# Patient Record
Sex: FEMALE | Race: BLACK OR AFRICAN AMERICAN | Employment: PART TIME
[De-identification: names, ages, dates, MRNs, and addresses within clinical notes are randomized per-mention and may not be internally consistent; named-entity substitution may affect disease eponyms.]

---

## 2017-01-11 ENCOUNTER — OFFICE VISIT (OUTPATIENT)
Dept: FAMILY MEDICINE CLINIC | Facility: CLINIC | Age: 62
End: 2017-01-11

## 2017-01-11 VITALS
HEART RATE: 71 BPM | DIASTOLIC BLOOD PRESSURE: 80 MMHG | SYSTOLIC BLOOD PRESSURE: 129 MMHG | HEIGHT: 66 IN | WEIGHT: 176.38 LBS | BODY MASS INDEX: 28.34 KG/M2

## 2017-01-11 DIAGNOSIS — E78.5 DYSLIPIDEMIA: ICD-10-CM

## 2017-01-11 DIAGNOSIS — E66.09 NON MORBID OBESITY DUE TO EXCESS CALORIES: ICD-10-CM

## 2017-01-11 DIAGNOSIS — Z00.00 PHYSICAL EXAM, ANNUAL: ICD-10-CM

## 2017-01-11 DIAGNOSIS — Z29.8 NEED FOR MALARIA PROPHYLAXIS: Primary | ICD-10-CM

## 2017-01-11 LAB
BILIRUBIN, POC: NORMAL
BLOOD URINE, POC: NORMAL
CLARITY, POC: CLEAR
COLOR, POC: YELLOW
GLUCOSE URINE, POC: NORMAL
KETONES, POC: NORMAL
LEUKOCYTE EST, POC: NORMAL
NITRITE, POC: NORMAL
PH, POC: 7
PROTEIN, POC: NORMAL
SPECIFIC GRAVITY, POC: 1.01
UROBILINOGEN, POC: NORMAL

## 2017-01-11 PROCEDURE — 99204 OFFICE O/P NEW MOD 45 MIN: CPT | Performed by: FAMILY MEDICINE

## 2017-01-11 PROCEDURE — 81003 URINALYSIS AUTO W/O SCOPE: CPT | Performed by: FAMILY MEDICINE

## 2017-01-11 ASSESSMENT — ENCOUNTER SYMPTOMS
ABDOMINAL PAIN: 0
DIARRHEA: 0
CHEST TIGHTNESS: 0
RHINORRHEA: 0
BACK PAIN: 0
TROUBLE SWALLOWING: 0
SORE THROAT: 0
SHORTNESS OF BREATH: 0
CONSTIPATION: 0
NAUSEA: 0
COUGH: 0

## 2017-01-23 LAB
ALBUMIN SERPL-MCNC: 4 G/DL
ALP BLD-CCNC: 69 U/L
ALT SERPL-CCNC: 17 U/L
AST SERPL-CCNC: 23 U/L
BASOPHILS ABSOLUTE: 0 /ΜL
BASOPHILS RELATIVE PERCENT: 0.4 %
BILIRUB SERPL-MCNC: 0.4 MG/DL (ref 0.1–1.4)
BUN BLDV-MCNC: 15 MG/DL
CALCIUM SERPL-MCNC: 10.1 MG/DL
CHLORIDE BLD-SCNC: 106 MMOL/L
CHOLESTEROL, TOTAL: 210 MG/DL
CHOLESTEROL/HDL RATIO: 4
CO2: 29 MMOL/L
CREAT SERPL-MCNC: 0.92 MG/DL
EOSINOPHILS ABSOLUTE: 0.2 /ΜL
EOSINOPHILS RELATIVE PERCENT: 2 %
GFR CALCULATED: 60
GLUCOSE BLD-MCNC: 86 MG/DL
HCT VFR BLD CALC: 40.3 % (ref 36–46)
HDLC SERPL-MCNC: 53 MG/DL (ref 35–70)
HEMOGLOBIN: 13.1 G/DL (ref 12–16)
LDL CHOLESTEROL CALCULATED: 138 MG/DL (ref 0–160)
LYMPHOCYTES ABSOLUTE: 4.2 /ΜL
LYMPHOCYTES RELATIVE PERCENT: 53.3 %
MCH RBC QN AUTO: 27 PG
MCHC RBC AUTO-ENTMCNC: 32.6 G/DL
MCV RBC AUTO: 83 FL
MONOCYTES ABSOLUTE: 0.4 /ΜL
MONOCYTES RELATIVE PERCENT: 4.9 %
NEUTROPHILS ABSOLUTE: 3.1 /ΜL
NEUTROPHILS RELATIVE PERCENT: 39.4 %
PDW BLD-RTO: 13.4 %
PLATELET # BLD: 258 K/ΜL
PMV BLD AUTO: 8.3 FL
POTASSIUM SERPL-SCNC: 4 MMOL/L
RBC # BLD: 4.86 10^6/ΜL
SODIUM BLD-SCNC: 141 MMOL/L
TOTAL PROTEIN: 7.2
TRIGL SERPL-MCNC: 93 MG/DL
VLDLC SERPL CALC-MCNC: 19 MG/DL
WBC # BLD: 7.9 10^3/ML

## 2017-01-24 DIAGNOSIS — Z00.00 PHYSICAL EXAM, ANNUAL: ICD-10-CM

## 2017-01-24 DIAGNOSIS — E78.5 DYSLIPIDEMIA: ICD-10-CM

## 2017-02-13 ENCOUNTER — OFFICE VISIT (OUTPATIENT)
Dept: FAMILY MEDICINE CLINIC | Facility: CLINIC | Age: 62
End: 2017-02-13

## 2017-02-13 VITALS
SYSTOLIC BLOOD PRESSURE: 135 MMHG | HEIGHT: 66 IN | HEART RATE: 69 BPM | DIASTOLIC BLOOD PRESSURE: 79 MMHG | BODY MASS INDEX: 28.45 KG/M2 | WEIGHT: 177 LBS

## 2017-02-13 DIAGNOSIS — E78.2 MIXED HYPERLIPIDEMIA: ICD-10-CM

## 2017-02-13 DIAGNOSIS — E66.09 NON MORBID OBESITY DUE TO EXCESS CALORIES: ICD-10-CM

## 2017-02-13 DIAGNOSIS — Z29.8 NEED FOR MALARIA PROPHYLAXIS: Primary | ICD-10-CM

## 2017-02-13 PROCEDURE — G8484 FLU IMMUNIZE NO ADMIN: HCPCS | Performed by: FAMILY MEDICINE

## 2017-02-13 PROCEDURE — G8419 CALC BMI OUT NRM PARAM NOF/U: HCPCS | Performed by: FAMILY MEDICINE

## 2017-02-13 PROCEDURE — G8427 DOCREV CUR MEDS BY ELIG CLIN: HCPCS | Performed by: FAMILY MEDICINE

## 2017-02-13 PROCEDURE — 1036F TOBACCO NON-USER: CPT | Performed by: FAMILY MEDICINE

## 2017-02-13 PROCEDURE — 3014F SCREEN MAMMO DOC REV: CPT | Performed by: FAMILY MEDICINE

## 2017-02-13 PROCEDURE — 99213 OFFICE O/P EST LOW 20 MIN: CPT | Performed by: FAMILY MEDICINE

## 2017-02-13 PROCEDURE — 3017F COLORECTAL CA SCREEN DOC REV: CPT | Performed by: FAMILY MEDICINE

## 2017-02-13 RX ORDER — ATOVAQUONE AND PROGUANIL HYDROCHLORIDE 250; 100 MG/1; MG/1
1 TABLET, FILM COATED ORAL WEEKLY
Qty: 20 TABLET | Refills: 0 | Status: SHIPPED | OUTPATIENT
Start: 2017-02-13 | End: 2017-09-20 | Stop reason: ALTCHOICE

## 2017-02-13 ASSESSMENT — PATIENT HEALTH QUESTIONNAIRE - PHQ9
SUM OF ALL RESPONSES TO PHQ9 QUESTIONS 1 & 2: 0
2. FEELING DOWN, DEPRESSED OR HOPELESS: 0
1. LITTLE INTEREST OR PLEASURE IN DOING THINGS: 0
SUM OF ALL RESPONSES TO PHQ QUESTIONS 1-9: 0

## 2017-02-15 ENCOUNTER — HOSPITAL ENCOUNTER (OUTPATIENT)
Age: 62
Setting detail: SPECIMEN
Discharge: HOME | End: 2017-02-15
Payer: COMMERCIAL

## 2017-02-15 ENCOUNTER — OFFICE VISIT (OUTPATIENT)
Dept: OBGYN | Facility: CLINIC | Age: 62
End: 2017-02-15

## 2017-02-15 VITALS
HEIGHT: 66 IN | DIASTOLIC BLOOD PRESSURE: 70 MMHG | SYSTOLIC BLOOD PRESSURE: 120 MMHG | WEIGHT: 183 LBS | BODY MASS INDEX: 29.41 KG/M2

## 2017-02-15 DIAGNOSIS — Z01.419 VISIT FOR GYNECOLOGIC EXAMINATION: Primary | ICD-10-CM

## 2017-02-15 DIAGNOSIS — Z12.31 ENCOUNTER FOR SCREENING MAMMOGRAM FOR BREAST CANCER: ICD-10-CM

## 2017-02-15 PROCEDURE — 99396 PREV VISIT EST AGE 40-64: CPT | Performed by: OBSTETRICS & GYNECOLOGY

## 2017-02-15 ASSESSMENT — ENCOUNTER SYMPTOMS
CONSTIPATION: 0
ABDOMINAL PAIN: 0
SHORTNESS OF BREATH: 0
ABDOMINAL DISTENTION: 0
DIARRHEA: 0
COUGH: 0
BACK PAIN: 0

## 2017-02-20 LAB — CYTOLOGY REPORT: NORMAL

## 2017-02-24 ASSESSMENT — ENCOUNTER SYMPTOMS
COUGH: 0
ABDOMINAL PAIN: 0
NAUSEA: 0
TROUBLE SWALLOWING: 0
DIARRHEA: 0
SHORTNESS OF BREATH: 0
RHINORRHEA: 0
SORE THROAT: 0
BACK PAIN: 0
CHEST TIGHTNESS: 0
CONSTIPATION: 0

## 2017-09-20 ENCOUNTER — OFFICE VISIT (OUTPATIENT)
Dept: FAMILY MEDICINE CLINIC | Age: 62
End: 2017-09-20
Payer: COMMERCIAL

## 2017-09-20 VITALS
WEIGHT: 171 LBS | DIASTOLIC BLOOD PRESSURE: 73 MMHG | HEIGHT: 66 IN | SYSTOLIC BLOOD PRESSURE: 123 MMHG | BODY MASS INDEX: 27.48 KG/M2 | HEART RATE: 69 BPM

## 2017-09-20 DIAGNOSIS — Z12.39 SPECIAL SCREENING EXAMINATION FOR NEOPLASM OF BREAST: ICD-10-CM

## 2017-09-20 DIAGNOSIS — J31.0 CHRONIC HYPERTROPHIC RHINITIS: ICD-10-CM

## 2017-09-20 DIAGNOSIS — M79.2 NEURALGIA OF RIGHT UPPER EXTREMITY: ICD-10-CM

## 2017-09-20 DIAGNOSIS — H92.01 OTALGIA OF RIGHT EAR: ICD-10-CM

## 2017-09-20 DIAGNOSIS — H61.21 IMPACTED CERUMEN OF RIGHT EAR: ICD-10-CM

## 2017-09-20 DIAGNOSIS — G44.031 INTRACTABLE EPISODIC PAROXYSMAL HEMICRANIA: Primary | ICD-10-CM

## 2017-09-20 DIAGNOSIS — G47.9 SLEEP DISORDER: ICD-10-CM

## 2017-09-20 DIAGNOSIS — E66.09 NON MORBID OBESITY DUE TO EXCESS CALORIES: ICD-10-CM

## 2017-09-20 PROCEDURE — G8419 CALC BMI OUT NRM PARAM NOF/U: HCPCS | Performed by: FAMILY MEDICINE

## 2017-09-20 PROCEDURE — 3014F SCREEN MAMMO DOC REV: CPT | Performed by: FAMILY MEDICINE

## 2017-09-20 PROCEDURE — 1036F TOBACCO NON-USER: CPT | Performed by: FAMILY MEDICINE

## 2017-09-20 PROCEDURE — 99214 OFFICE O/P EST MOD 30 MIN: CPT | Performed by: FAMILY MEDICINE

## 2017-09-20 PROCEDURE — G8427 DOCREV CUR MEDS BY ELIG CLIN: HCPCS | Performed by: FAMILY MEDICINE

## 2017-09-20 PROCEDURE — 3017F COLORECTAL CA SCREEN DOC REV: CPT | Performed by: FAMILY MEDICINE

## 2017-09-20 RX ORDER — TIZANIDINE 4 MG/1
4 TABLET ORAL NIGHTLY PRN
Qty: 30 TABLET | Refills: 1 | Status: SHIPPED | OUTPATIENT
Start: 2017-09-20 | End: 2018-11-14 | Stop reason: ALTCHOICE

## 2017-09-20 ASSESSMENT — ENCOUNTER SYMPTOMS
SHORTNESS OF BREATH: 0
NAUSEA: 1
RHINORRHEA: 0
BACK PAIN: 0
DIARRHEA: 0
TROUBLE SWALLOWING: 0
CONSTIPATION: 0
CHEST TIGHTNESS: 0
SORE THROAT: 0
ABDOMINAL PAIN: 0
COUGH: 0

## 2017-10-26 ENCOUNTER — OFFICE VISIT (OUTPATIENT)
Dept: FAMILY MEDICINE CLINIC | Age: 62
End: 2017-10-26
Payer: COMMERCIAL

## 2017-10-26 VITALS
HEART RATE: 64 BPM | HEIGHT: 66 IN | DIASTOLIC BLOOD PRESSURE: 72 MMHG | BODY MASS INDEX: 28.61 KG/M2 | SYSTOLIC BLOOD PRESSURE: 117 MMHG | WEIGHT: 178 LBS

## 2017-10-26 DIAGNOSIS — G44.039 EPISODIC PAROXYSMAL HEMICRANIA, NOT INTRACTABLE: Primary | ICD-10-CM

## 2017-10-26 DIAGNOSIS — J31.0 CHRONIC HYPERTROPHIC RHINITIS: ICD-10-CM

## 2017-10-26 DIAGNOSIS — E66.09 NON MORBID OBESITY DUE TO EXCESS CALORIES: ICD-10-CM

## 2017-10-26 PROCEDURE — 1036F TOBACCO NON-USER: CPT | Performed by: FAMILY MEDICINE

## 2017-10-26 PROCEDURE — G8427 DOCREV CUR MEDS BY ELIG CLIN: HCPCS | Performed by: FAMILY MEDICINE

## 2017-10-26 PROCEDURE — G8484 FLU IMMUNIZE NO ADMIN: HCPCS | Performed by: FAMILY MEDICINE

## 2017-10-26 PROCEDURE — 3017F COLORECTAL CA SCREEN DOC REV: CPT | Performed by: FAMILY MEDICINE

## 2017-10-26 PROCEDURE — 3014F SCREEN MAMMO DOC REV: CPT | Performed by: FAMILY MEDICINE

## 2017-10-26 PROCEDURE — G8419 CALC BMI OUT NRM PARAM NOF/U: HCPCS | Performed by: FAMILY MEDICINE

## 2017-10-26 PROCEDURE — 99213 OFFICE O/P EST LOW 20 MIN: CPT | Performed by: FAMILY MEDICINE

## 2017-10-26 ASSESSMENT — ENCOUNTER SYMPTOMS
ABDOMINAL PAIN: 0
CONSTIPATION: 0
DIARRHEA: 0
SORE THROAT: 0
COUGH: 1
TROUBLE SWALLOWING: 0
SHORTNESS OF BREATH: 1

## 2017-10-26 NOTE — PROGRESS NOTES
Visit Information    Have you changed or started any medications since your last visit including any over-the-counter medicines, vitamins, or herbal medicines? no   Are you having any side effects from any of your medications? -  no  Have you stopped taking any of your medications? Is so, why? -  no    Have you seen any other physician or provider since your last visit? No  Have you had any other diagnostic tests since your last visit? No  Have you been seen in the emergency room and/or had an admission to a hospital since we last saw you? No  Have you had your routine dental cleaning in the past 6 months? no    Have you activated your [email or web address] account? If not, what are your barriers?  Yes     Patient Care Team:  Maxi eHrnandez MD as PCP - General (Family Medicine)    Medical History Review  Past Medical, Family, and Social History reviewed and does contribute to the patient presenting condition    Health Maintenance   Topic Date Due    Hepatitis C screen  1955    HIV screen  01/21/1972    Colon cancer screen colonoscopy  01/21/2007    Breast cancer screen  05/26/2017    Zostavax vaccine  12/20/2017 (Originally 1/21/2017)    DTaP/Tdap/Td vaccine (1 - Tdap) 12/20/2017 (Originally 1/21/1976)    Flu vaccine (1) 12/20/2017 (Originally 9/1/2017)    Cervical cancer screen  02/15/2018    Lipid screen  01/23/2022
orders of the defined types were placed in this encounter. Findings and/or pathophysiology discussed with patient. Plan of treatment discussed. Patient's medical problems were discussed with her. Findings were explained. Chart was evaluated. Available lab work and tests results were discussed. Her medications were reviewed. Patient will take Tylenol/ibuprofen for her headaches at present. She'll use saline nasal spray. Health maintenance was discussed. Weight management was discussed. Diet and activity were discussed. 1.  Elena received counseling on the following healthy behaviors: nutrition and exercise  2. Patient given educational materials - see patient instructions  3. Was a self-tracking handout given in paper form or via [employer]? No  If yes, see orders or list here. 4.  Discussed use, benefit, and side effects of prescribed medications. Barriers to medication compliance addressed. All patient questions answered. Pt voiced understanding. 5.  Reviewed prior labs and health maintenance  6. Continue current medications, diet and exercise.     Completed Refills   Requested Prescriptions      No prescriptions requested or ordered in this encounter     Electronically signed by Abner Oswald MD on 11/3/2017 at 10:18 AM

## 2017-10-26 NOTE — PATIENT INSTRUCTIONS

## 2018-04-12 PROBLEM — Z12.39 SPECIAL SCREENING EXAMINATION FOR NEOPLASM OF BREAST: Status: RESOLVED | Noted: 2017-09-20 | Resolved: 2018-04-12

## 2018-11-14 ENCOUNTER — OFFICE VISIT (OUTPATIENT)
Dept: PRIMARY CARE CLINIC | Age: 63
End: 2018-11-14
Payer: COMMERCIAL

## 2018-11-14 VITALS
BODY MASS INDEX: 28.93 KG/M2 | RESPIRATION RATE: 20 BRPM | TEMPERATURE: 98.2 F | WEIGHT: 180 LBS | DIASTOLIC BLOOD PRESSURE: 80 MMHG | HEART RATE: 69 BPM | SYSTOLIC BLOOD PRESSURE: 136 MMHG | HEIGHT: 66 IN | OXYGEN SATURATION: 98 %

## 2018-11-14 DIAGNOSIS — E78.2 MIXED HYPERLIPIDEMIA: ICD-10-CM

## 2018-11-14 DIAGNOSIS — Z12.39 SCREENING FOR MALIGNANT NEOPLASM OF BREAST: ICD-10-CM

## 2018-11-14 DIAGNOSIS — Z12.11 SCREENING FOR MALIGNANT NEOPLASM OF COLON: ICD-10-CM

## 2018-11-14 DIAGNOSIS — Z13.29 SCREENING FOR THYROID DISORDER: ICD-10-CM

## 2018-11-14 DIAGNOSIS — Z00.00 WELL ADULT EXAM: Primary | ICD-10-CM

## 2018-11-14 PROCEDURE — 99396 PREV VISIT EST AGE 40-64: CPT | Performed by: NURSE PRACTITIONER

## 2018-11-14 PROCEDURE — G8484 FLU IMMUNIZE NO ADMIN: HCPCS | Performed by: NURSE PRACTITIONER

## 2018-11-14 ASSESSMENT — ENCOUNTER SYMPTOMS
SINUS PRESSURE: 0
CHEST TIGHTNESS: 0
RHINORRHEA: 0
DIARRHEA: 0
EYE DISCHARGE: 0
COUGH: 0
CONSTIPATION: 0
BLOOD IN STOOL: 0
ABDOMINAL PAIN: 0
SHORTNESS OF BREATH: 0

## 2018-11-14 ASSESSMENT — PATIENT HEALTH QUESTIONNAIRE - PHQ9
2. FEELING DOWN, DEPRESSED OR HOPELESS: 0
SUM OF ALL RESPONSES TO PHQ QUESTIONS 1-9: 0
SUM OF ALL RESPONSES TO PHQ QUESTIONS 1-9: 0
SUM OF ALL RESPONSES TO PHQ9 QUESTIONS 1 & 2: 0
1. LITTLE INTEREST OR PLEASURE IN DOING THINGS: 0

## 2018-11-14 NOTE — PROGRESS NOTES
2018    Laura Bailey (:  1955) is a 66 y.o. female, here for a preventive medicine evaluation. She would also like a bug bite looked at. This has been occurring since oct 5, she does not k now if she was in [place]. She used some home remedies ie: garlic and peroxide. She also tried peroxide and anti itch. Does not want a flu shot      Patient Active Problem List   Diagnosis    Pruritus    Ankle sprain    Foot sprain    Rhinitis    Sleep disorder    Non morbid obesity due to excess calories    Impacted cerumen of right ear    Otalgia of right ear    Chronic hypertrophic rhinitis       Review of Systems   Constitutional: Negative for activity change, appetite change, chills, fatigue and fever. HENT: Negative for congestion, ear pain, rhinorrhea and sinus pressure. Eyes: Negative for discharge and visual disturbance. Respiratory: Negative for cough, chest tightness and shortness of breath. Cardiovascular: Negative for chest pain, palpitations and leg swelling. Gastrointestinal: Negative for abdominal pain, blood in stool, constipation and diarrhea. Endocrine: Negative for cold intolerance and heat intolerance. Genitourinary: Negative for difficulty urinating and hematuria. Musculoskeletal: Negative for arthralgias and myalgias. Skin: Negative for rash. Neurological: Negative for dizziness, light-headedness and headaches. Psychiatric/Behavioral: Negative for dysphoric mood and self-injury. Prior to Visit Medications    Medication Sig Taking? Authorizing Provider   Multiple Vitamins-Minerals (ZINC PO) Take by mouth Yes Historical Provider, MD   MAGNESIUM PO Take by mouth Yes Historical Provider, MD Hurt[place]  by Does not apply route. Yes Historical Provider, MD   vitamin B-12 (CYANOCOBALAMIN) 100 MCG tablet Take 50 mcg by mouth daily. Yes Historical Provider, MD   Multiple Vitamin (MULTIVITAMINS PO) Take  by mouth.  Yes Historical Provider, Neck: Trachea normal, normal range of motion and full passive range of motion without pain. No thyroid mass present. Cardiovascular: Normal rate, regular rhythm, S1 normal, S2 normal and normal heart sounds. Exam reveals no distant heart sounds and no friction rub. Pulmonary/Chest: Effort normal and breath sounds normal. No accessory muscle usage. No respiratory distress. Abdominal: Soft. Bowel sounds are normal. She exhibits no distension, no ascites and no mass. Musculoskeletal: Normal range of motion. Pain free ROM     Lymphadenopathy:     She has no cervical adenopathy. Neurological: She is oriented to person, place, and time. Gait is normal.   Skin: Skin is warm and dry. No rash noted. She is not diaphoretic. Psychiatric: She has a normal mood and affect. Her behavior is normal. Judgment and thought content normal.       No flowsheet data found. Lab Results   Component Value Date    CHOL 210 01/23/2017    CHOL 201 05/10/2013    CHOL 206 11/10/2012    TRIG 93 01/23/2017    TRIG 63 05/10/2013    TRIG 86 11/10/2012    HDL 53 01/23/2017    HDL 59 05/10/2013    HDL 57 11/10/2012    LDLCHOLESTEROL 132 11/10/2012    LDLCALC 138 01/23/2017    LDLCALC 129 05/10/2013    GLUCOSE 86 01/23/2017       The 10-year ASCVD risk score (Carol Garcia et al., 2013) is: 7.1%    Values used to calculate the score:      Age: 66 years      Sex: Female      Is Non- : Yes      Diabetic: No      Tobacco smoker: No      Systolic Blood Pressure: 241 mmHg      Is BP treated: No      HDL Cholesterol: 53 mg/dL      Total Cholesterol: 210 mg/dL      There is no immunization history on file for this patient.     Health Maintenance   Topic Date Due    Colon cancer screen colonoscopy  01/21/2007    Breast cancer screen  11/13/2018    Cervical cancer screen  02/14/2019 (Originally 2/15/2018)    DTaP/Tdap/Td vaccine (1 - Tdap) 09/01/2019 (Originally 1/21/1976)    Flu vaccine (1) 09/01/2019

## 2018-11-15 ENCOUNTER — TELEPHONE (OUTPATIENT)
Dept: GASTROENTEROLOGY | Age: 63
End: 2018-11-15

## 2018-11-30 ENCOUNTER — HOSPITAL ENCOUNTER (OUTPATIENT)
Age: 63
Discharge: HOME | End: 2018-11-30
Payer: COMMERCIAL

## 2018-11-30 DIAGNOSIS — Z13.29 SCREENING FOR THYROID DISORDER: ICD-10-CM

## 2018-11-30 DIAGNOSIS — E78.2 MIXED HYPERLIPIDEMIA: ICD-10-CM

## 2018-11-30 LAB
CHOLESTEROL/HDL RATIO: 3.9
CHOLESTEROL: 238 MG/DL
HDLC SERPL-MCNC: 61 MG/DL
LDL CHOLESTEROL: 156 MG/DL (ref 0–130)
TRIGL SERPL-MCNC: 104 MG/DL
TSH SERPL DL<=0.05 MIU/L-ACNC: 4.66 MIU/L (ref 0.3–5)
VLDLC SERPL CALC-MCNC: ABNORMAL MG/DL (ref 1–30)

## 2018-11-30 PROCEDURE — 84443 ASSAY THYROID STIM HORMONE: CPT

## 2018-11-30 PROCEDURE — 36415 COLL VENOUS BLD VENIPUNCTURE: CPT

## 2018-11-30 PROCEDURE — 80061 LIPID PANEL: CPT

## 2018-12-14 ENCOUNTER — HOSPITAL ENCOUNTER (OUTPATIENT)
Dept: MAMMOGRAPHY | Age: 63
Discharge: HOME | End: 2018-12-16
Payer: COMMERCIAL

## 2018-12-14 DIAGNOSIS — Z12.39 SCREENING FOR MALIGNANT NEOPLASM OF BREAST: ICD-10-CM

## 2018-12-14 PROCEDURE — 77067 SCR MAMMO BI INCL CAD: CPT

## 2019-03-02 ENCOUNTER — TELEPHONE (OUTPATIENT)
Dept: GASTROENTEROLOGY | Age: 64
End: 2019-03-02

## 2019-04-10 ENCOUNTER — TELEPHONE (OUTPATIENT)
Dept: GASTROENTEROLOGY | Age: 64
End: 2019-04-10

## 2019-04-10 DIAGNOSIS — Z12.11 SCREENING FOR COLON CANCER: Primary | ICD-10-CM

## 2019-04-10 RX ORDER — POLYETHYLENE GLYCOL 3350 17 G/17G
POWDER, FOR SOLUTION ORAL
Qty: 255 G | Refills: 0 | Status: CANCELLED | OUTPATIENT
Start: 2019-04-10

## 2019-05-14 RX ORDER — POLYETHYLENE GLYCOL 3350 17 G/17G
POWDER, FOR SOLUTION ORAL
Qty: 238 G | Refills: 0 | Status: SHIPPED | OUTPATIENT
Start: 2019-05-14 | End: 2022-03-17

## 2019-05-18 ENCOUNTER — ANESTHESIA EVENT (OUTPATIENT)
Dept: OPERATING ROOM | Age: 64
End: 2019-05-18
Payer: COMMERCIAL

## 2019-05-18 ENCOUNTER — HOSPITAL ENCOUNTER (OUTPATIENT)
Age: 64
Setting detail: OUTPATIENT SURGERY
Discharge: HOME | End: 2019-05-18
Attending: INTERNAL MEDICINE | Admitting: INTERNAL MEDICINE
Payer: COMMERCIAL

## 2019-05-18 ENCOUNTER — ANESTHESIA (OUTPATIENT)
Dept: OPERATING ROOM | Age: 64
End: 2019-05-18
Payer: COMMERCIAL

## 2019-05-18 VITALS
RESPIRATION RATE: 15 BRPM | DIASTOLIC BLOOD PRESSURE: 59 MMHG | OXYGEN SATURATION: 98 % | SYSTOLIC BLOOD PRESSURE: 96 MMHG | TEMPERATURE: 96.8 F

## 2019-05-18 VITALS
WEIGHT: 175 LBS | HEIGHT: 67 IN | SYSTOLIC BLOOD PRESSURE: 132 MMHG | OXYGEN SATURATION: 98 % | BODY MASS INDEX: 27.47 KG/M2 | TEMPERATURE: 97.7 F | DIASTOLIC BLOOD PRESSURE: 75 MMHG | RESPIRATION RATE: 12 BRPM | HEART RATE: 58 BPM

## 2019-05-18 PROCEDURE — 2709999900 HC NON-CHARGEABLE SUPPLY: Performed by: INTERNAL MEDICINE

## 2019-05-18 PROCEDURE — 2580000003 HC RX 258: Performed by: INTERNAL MEDICINE

## 2019-05-18 PROCEDURE — 3700000001 HC ADD 15 MINUTES (ANESTHESIA): Performed by: INTERNAL MEDICINE

## 2019-05-18 PROCEDURE — 2580000003 HC RX 258: Performed by: NURSE ANESTHETIST, CERTIFIED REGISTERED

## 2019-05-18 PROCEDURE — 6360000002 HC RX W HCPCS: Performed by: NURSE ANESTHETIST, CERTIFIED REGISTERED

## 2019-05-18 PROCEDURE — 3700000000 HC ANESTHESIA ATTENDED CARE: Performed by: INTERNAL MEDICINE

## 2019-05-18 PROCEDURE — 7100000000 HC PACU RECOVERY - FIRST 15 MIN: Performed by: INTERNAL MEDICINE

## 2019-05-18 PROCEDURE — 3609027000 HC COLONOSCOPY: Performed by: INTERNAL MEDICINE

## 2019-05-18 PROCEDURE — 7100000001 HC PACU RECOVERY - ADDTL 15 MIN: Performed by: INTERNAL MEDICINE

## 2019-05-18 RX ORDER — DIPHENHYDRAMINE HYDROCHLORIDE 50 MG/ML
12.5 INJECTION INTRAMUSCULAR; INTRAVENOUS
Status: DISCONTINUED | OUTPATIENT
Start: 2019-05-18 | End: 2019-05-18 | Stop reason: HOSPADM

## 2019-05-18 RX ORDER — SODIUM CHLORIDE, SODIUM LACTATE, POTASSIUM CHLORIDE, CALCIUM CHLORIDE 600; 310; 30; 20 MG/100ML; MG/100ML; MG/100ML; MG/100ML
INJECTION, SOLUTION INTRAVENOUS CONTINUOUS PRN
Status: DISCONTINUED | OUTPATIENT
Start: 2019-05-18 | End: 2019-05-18 | Stop reason: SDUPTHER

## 2019-05-18 RX ORDER — LABETALOL 20 MG/4 ML (5 MG/ML) INTRAVENOUS SYRINGE
5 EVERY 10 MIN PRN
Status: DISCONTINUED | OUTPATIENT
Start: 2019-05-18 | End: 2019-05-18 | Stop reason: HOSPADM

## 2019-05-18 RX ORDER — PROPOFOL 10 MG/ML
INJECTION, EMULSION INTRAVENOUS CONTINUOUS PRN
Status: DISCONTINUED | OUTPATIENT
Start: 2019-05-18 | End: 2019-05-18 | Stop reason: SDUPTHER

## 2019-05-18 RX ORDER — PROPOFOL 10 MG/ML
INJECTION, EMULSION INTRAVENOUS PRN
Status: DISCONTINUED | OUTPATIENT
Start: 2019-05-18 | End: 2019-05-18 | Stop reason: SDUPTHER

## 2019-05-18 RX ORDER — ONDANSETRON 2 MG/ML
4 INJECTION INTRAMUSCULAR; INTRAVENOUS
Status: DISCONTINUED | OUTPATIENT
Start: 2019-05-18 | End: 2019-05-18 | Stop reason: HOSPADM

## 2019-05-18 RX ORDER — MORPHINE SULFATE 2 MG/ML
2 INJECTION, SOLUTION INTRAMUSCULAR; INTRAVENOUS EVERY 5 MIN PRN
Status: DISCONTINUED | OUTPATIENT
Start: 2019-05-18 | End: 2019-05-18 | Stop reason: HOSPADM

## 2019-05-18 RX ORDER — MEPERIDINE HYDROCHLORIDE 50 MG/ML
12.5 INJECTION INTRAMUSCULAR; INTRAVENOUS; SUBCUTANEOUS EVERY 5 MIN PRN
Status: DISCONTINUED | OUTPATIENT
Start: 2019-05-18 | End: 2019-05-18 | Stop reason: HOSPADM

## 2019-05-18 RX ORDER — SODIUM CHLORIDE, SODIUM LACTATE, POTASSIUM CHLORIDE, CALCIUM CHLORIDE 600; 310; 30; 20 MG/100ML; MG/100ML; MG/100ML; MG/100ML
INJECTION, SOLUTION INTRAVENOUS CONTINUOUS
Status: DISCONTINUED | OUTPATIENT
Start: 2019-05-18 | End: 2019-05-18 | Stop reason: HOSPADM

## 2019-05-18 RX ADMIN — PROPOFOL 100 MCG/KG/MIN: 10 INJECTION, EMULSION INTRAVENOUS at 09:51

## 2019-05-18 RX ADMIN — PROPOFOL 50 MG: 10 INJECTION, EMULSION INTRAVENOUS at 09:51

## 2019-05-18 RX ADMIN — SODIUM CHLORIDE, POTASSIUM CHLORIDE, SODIUM LACTATE AND CALCIUM CHLORIDE: 600; 310; 30; 20 INJECTION, SOLUTION INTRAVENOUS at 09:42

## 2019-05-18 RX ADMIN — PROPOFOL 50 MG: 10 INJECTION, EMULSION INTRAVENOUS at 10:00

## 2019-05-18 RX ADMIN — PROPOFOL 50 MG: 10 INJECTION, EMULSION INTRAVENOUS at 09:57

## 2019-05-18 RX ADMIN — PROPOFOL 50 MG: 10 INJECTION, EMULSION INTRAVENOUS at 09:53

## 2019-05-18 RX ADMIN — SODIUM CHLORIDE, POTASSIUM CHLORIDE, SODIUM LACTATE AND CALCIUM CHLORIDE: 600; 310; 30; 20 INJECTION, SOLUTION INTRAVENOUS at 07:07

## 2019-05-18 ASSESSMENT — PULMONARY FUNCTION TESTS
PIF_VALUE: 1
PIF_VALUE: 1
PIF_VALUE: 0
PIF_VALUE: 1
PIF_VALUE: 0
PIF_VALUE: 1
PIF_VALUE: 0
PIF_VALUE: 1
PIF_VALUE: 0
PIF_VALUE: 1
PIF_VALUE: 1
PIF_VALUE: 0
PIF_VALUE: 1
PIF_VALUE: 0
PIF_VALUE: 1
PIF_VALUE: 0
PIF_VALUE: 1
PIF_VALUE: 0
PIF_VALUE: 0
PIF_VALUE: 1
PIF_VALUE: 0
PIF_VALUE: 1
PIF_VALUE: 0

## 2019-05-18 ASSESSMENT — PAIN - FUNCTIONAL ASSESSMENT: PAIN_FUNCTIONAL_ASSESSMENT: 0-10

## 2019-05-18 ASSESSMENT — LIFESTYLE VARIABLES: SMOKING_STATUS: 0

## 2019-05-18 ASSESSMENT — ENCOUNTER SYMPTOMS
STRIDOR: 0
SHORTNESS OF BREATH: 0

## 2019-05-18 NOTE — OP NOTE
COLONOSCOPY    DATE OF PROCEDURE: 5/18/2019       SURGEON: Max Calle MD    ASSISTANT: None    PREOPERATIVE DIAGNOSIS: Screening colonoscopy. POSTOPERATIVE DIAGNOSIS: No lesions seen. OPERATION: Total colonoscopy     ANESTHESIA: MAC    ESTIMATED BLOOD LOSS: None    COMPLICATIONS: None     SPECIMENS:  Was Not Obtained    HISTORY: The patient is a 60y.o. year old female with history of above preop diagnosis. I recommended colonoscopy with possible biopsy or polypectomy and I explained the risk, benefits, expected outcome, and alternatives to the procedure. Risks included but are not limited to bleeding, infection, respiratory distress, hypotension, and perforation of the colon and possibility of missing a lesion. The patient understands and is in agreement. PROCEDURE:  The patient's SPO2 remained above 90% throughout the procedure. Digital rectal exam was normal.  The colonoscope was inserted through the anus into the rectum and advanced under direct vision to the cecum without difficulty. Terminal ileum was examined for approximately 2 inches. The prep was good. Findings:  Terminal ileum: normal    Cecum/Ascending colon: normal    Transverse colon: normal    Descending/Sigmoid colon: normal    Rectum/Anus: examined in normal and retroflexed positions and was normal, may have small hemorrhoids. Withdrawal Time was (minutes): 12          The colon was decompressed and the scope was removed. The patient tolerated the procedure and conscious sedation without unusual events. During the procedure, the patient's blood pressure, pulse and oxygen saturation remained stable and documented. No unusual events occurred during the procedure. Patient was transferred to recovery room and will be discharged when criteria is met. Recommendations/Plan:   1. F/U Biopsies  2. F/U In Office as instructed  3. Discussed with the family  4. High fiber diet   5.  Precautions to avoid constipation Next colonoscopy: 10 years.       Electronically signed by Fabián Hubbard MD  on 5/18/2019 at 10:24 AM

## 2019-05-18 NOTE — H&P
None     Minutes per session: None    Stress: None   Relationships    Social connections:     Talks on phone: None     Gets together: None     Attends Spiritism service: None     Active member of club or organization: None     Attends meetings of clubs or organizations: None     Relationship status: None    Intimate partner violence:     Fear of current or ex partner: None     Emotionally abused: None     Physically abused: None     Forced sexual activity: None   Other Topics Concern    None   Social History Narrative    None           REVIEW OF SYSTEMS      No Known Allergies    No current facility-administered medications on file prior to encounter. Current Outpatient Medications on File Prior to Encounter   Medication Sig Dispense Refill    Multiple Vitamins-Minerals (ZINC PO) Take by mouth      MAGNESIUM PO Take by mouth      FISH OIL by Does not apply route.  vitamin B-12 (CYANOCOBALAMIN) 100 MCG tablet Take 50 mcg by mouth daily.  Multiple Vitamin (MULTIVITAMINS PO) Take  by mouth. General health:  Fairly good. No fever or chills. Skin:  No itching, redness or rash. HEENT:  No headache, epistaxis or sore throat. Neck:  No pain, stiffness or masses. Cardiovascular/Respiratory system:  No chest pain, palpitations or shortness of breath. Gastrointestinal tract: See HPI. Genitourinary:  No burning on micturition. No hesitancy, urgency, frequency or discoloration of urine. Musculoskeletal:  No bone or joint pains. No swelling. Neuropsychiatric:  No referable complaints. GENERAL PHYSICAL EXAM:     Vitals: /67   Pulse 63   Temp 97.5 °F (36.4 °C) (Oral)   Resp 16   Ht 5' 7\" (1.702 m)   Wt 175 lb (79.4 kg)   LMP  (LMP Unknown)   SpO2 100%   BMI 27.41 kg/m²  Body mass index is 27.41 kg/m².        GENERAL APPEARANCE:   Cat El is 60 y.o.,  female, mildly obese, nourished, conscious, alert. Does not appear to be distress or pain at this time. SKIN:  Warm, dry, no cyanosis or jaundice. HEAD:  Normocephalic, atraumatic, no swelling or tenderness. EYES:  Pupils equal, reactive to light. EARS:  No discharge, no marked hearing loss. NOSE:  No rhinorrhea, epistaxis or septal deformity. THROAT:  Not congested. No ulceration bleeding or discharge. NECK:  No stiffness, trachea central.                  CHEST:  Symmetrical and equal on expansion. HEART:  RRR S1 > S2. No audible murmurs or gallops. LUNGS:  Equal on expansion, normal breath sounds. ABDOMEN:  Obese. Soft on palpation. No localized tenderness. No guarding or rigidity. LYMPHATICS:  No palpable cervical lymphadenopathy. LOCOMOTOR, BACK AND SPINE:  No tenderness or deformities. EXTREMITIES:  Symmetrical. Moves all extremities. NEUROLOGIC:  The patient is conscious, alert, oriented, No apparent focal sensory or motor deficits.                                                                                    PROVISIONAL DIAGNOSES / SURGERY:      Screening Colonoscopy    JOE Scanlon CNP on 5/18/2019 at 7:13 AM

## 2019-05-18 NOTE — ANESTHESIA PRE PROCEDURE
FOOT SURGERY  10 years ago    TUBAL LIGATION         Social History:    Social History     Tobacco Use    Smoking status: Never Smoker    Smokeless tobacco: Never Used   Substance Use Topics    Alcohol use: No                                Counseling given: Not Answered      Vital Signs (Current):   Vitals:    05/18/19 0702   BP: 118/67   Pulse: 63   Resp: 16   Temp: 97.5 °F (36.4 °C)   TempSrc: Oral   SpO2: 100%   Weight: 175 lb (79.4 kg)   Height: 5' 7\" (1.702 m)                                              BP Readings from Last 3 Encounters:   05/18/19 118/67   11/14/18 136/80   10/26/17 117/72       NPO Status: Time of last liquid consumption: 2200                        Time of last solid consumption: 2145                        Date of last liquid consumption: 05/17/19                        Date of last solid food consumption: 05/16/19    BMI:   Wt Readings from Last 3 Encounters:   05/18/19 175 lb (79.4 kg)   11/14/18 180 lb (81.6 kg)   10/26/17 178 lb (80.7 kg)     Body mass index is 27.41 kg/m². CBC:   Lab Results   Component Value Date    WBC 7.9 01/23/2017    RBC 4.86 01/23/2017    HGB 13.1 01/23/2017    HCT 40.3 01/23/2017    MCV 83 01/23/2017    RDW 13.4 01/23/2017     01/23/2017       CMP:   Lab Results   Component Value Date     01/23/2017    K 4.0 01/23/2017     01/23/2017    CO2 29 01/23/2017    BUN 15 01/23/2017    CREATININE 0.92 01/23/2017    GFRAA >60 11/10/2012    LABGLOM 60 01/23/2017    LABGLOM 59 11/10/2012    GLUCOSE 86 01/23/2017    CALCIUM 10.1 01/23/2017    BILITOT 0.4 01/23/2017    ALKPHOS 69 01/23/2017    AST 23 01/23/2017    ALT 17 01/23/2017       POC Tests: No results for input(s): POCGLU, POCNA, POCK, POCCL, POCBUN, POCHEMO, POCHCT in the last 72 hours.     Coags: No results found for: PROTIME, INR, APTT    HCG (If Applicable): No results found for: PREGTESTUR, PREGSERUM, HCG, HCGQUANT     ABGs: No results found for: PHART, PO2ART, VAQ4QMW, GES2RVM, BEART, X9KIKSJT     Type & Screen (If Applicable):  No results found for: [facility]    Anesthesia Evaluation  Patient summary reviewed  Airway: Mallampati: II  TM distance: >3 FB   Neck ROM: full  Mouth opening: > = 3 FB Dental: normal exam         Pulmonary:Negative Pulmonary ROS and normal exam  breath sounds clear to auscultation      (-) pneumonia, COPD, asthma, shortness of breath, recent URI, sleep apnea, rhonchi, wheezes, rales, stridor and not a current smoker                           Cardiovascular:  Exercise tolerance: good (>4 METS),   (+) hyperlipidemia    (-) pacemaker, hypertension, valvular problems/murmurs, past MI, CAD, CABG/stent, dysrhythmias,  angina,  CHF, orthopnea, PND,  KATE, murmur, weak pulses,  friction rub, systolic click, carotid bruit,  JVD and peripheral edema      Rhythm: regular  Rate: normal           Beta Blocker:  Not on Beta Blocker         Neuro/Psych:   Negative Neuro/Psych ROS     (-) seizures, neuromuscular disease, TIA, CVA, headaches, psychiatric history and depression/anxiety            GI/Hepatic/Renal: Neg GI/Hepatic/Renal ROS       (-) hiatal hernia, GERD, PUD, hepatitis, liver disease, no renal disease, bowel prep and no morbid obesity       Endo/Other: Negative Endo/Other ROS   (+) no malignancy/cancer. (-) diabetes mellitus, hypothyroidism, hyperthyroidism, blood dyscrasia, arthritis, no electrolyte abnormalities, no malignancy/cancer               Abdominal:           Vascular: negative vascular ROS. - PVD, DVT and PE. Anesthesia Plan      MAC and general     ASA 2       Induction: intravenous. MIPS: Postoperative opioids intended and Prophylactic antiemetics administered. Anesthetic plan and risks discussed with patient.                       Jaimie Mendes MD   5/18/2019

## 2019-05-18 NOTE — ANESTHESIA POSTPROCEDURE EVALUATION
POST- ANESTHESIA EVALUATION       Pt Name: Chavez Og  MRN: [id number]  YOB: 1955  Date of evaluation: 5/18/2019  Time:  11:12 AM      /75   Pulse 58   Temp 98.4 °F (36.9 °C) (Infrared)   Resp 12   Ht 5' 7\" (1.702 m)   Wt 175 lb (79.4 kg)   LMP  (LMP Unknown)   SpO2 98%   BMI 27.41 kg/m²      Consciousness Level  Awake  Cardiopulmonary Status  Stable  Pain Adequately Treated YES  Nausea / Vomiting  NO  Adequate Hydration  YES  Anesthesia Related Complications NONE      Electronically signed by Madhu Childress MD on 5/18/2019 at 11:12 AM       Department of Anesthesiology  Postprocedure Note    Patient: Chavez Og  MRN: [id number]  YOB: 1955  Date of evaluation: 5/18/2019  Time:  11:12 AM     Procedure Summary     Date:  05/18/19 Room / Location:  [place] [address]    Anesthesia Start:  2907 Anesthesia Stop:  1024    Procedure:  COLONOSCOPY SCREENING (N/A ) Diagnosis:  (SCREENING  (PAT PER ANES ON ADMIT))    Surgeon:  [address] [place] [address], MD Responsible Provider:  Madhu Childress MD    Anesthesia Type:  MAC, general ASA Status:  2          Anesthesia Type: MAC, general    Elissa Phase I: Elissa Score: 9    Elissa Phase II:      Last vitals: Reviewed and per EMR flowsheets.        Anesthesia Post Evaluation

## 2020-06-19 ENCOUNTER — HOSPITAL ENCOUNTER (OUTPATIENT)
Dept: MAMMOGRAPHY | Age: 65
Discharge: HOME | End: 2020-06-21
Payer: COMMERCIAL

## 2020-06-19 PROCEDURE — 77063 BREAST TOMOSYNTHESIS BI: CPT

## 2020-10-07 ENCOUNTER — OFFICE VISIT (OUTPATIENT)
Dept: PRIMARY CARE CLINIC | Age: 65
End: 2020-10-07
Payer: COMMERCIAL

## 2020-10-07 VITALS
SYSTOLIC BLOOD PRESSURE: 124 MMHG | HEIGHT: 66 IN | OXYGEN SATURATION: 98 % | TEMPERATURE: 97.9 F | WEIGHT: 173.8 LBS | DIASTOLIC BLOOD PRESSURE: 75 MMHG | BODY MASS INDEX: 27.93 KG/M2 | HEART RATE: 64 BPM

## 2020-10-07 PROCEDURE — G8484 FLU IMMUNIZE NO ADMIN: HCPCS | Performed by: NURSE PRACTITIONER

## 2020-10-07 PROCEDURE — 99396 PREV VISIT EST AGE 40-64: CPT | Performed by: NURSE PRACTITIONER

## 2020-10-07 ASSESSMENT — PATIENT HEALTH QUESTIONNAIRE - PHQ9
2. FEELING DOWN, DEPRESSED OR HOPELESS: 0
1. LITTLE INTEREST OR PLEASURE IN DOING THINGS: 0
SUM OF ALL RESPONSES TO PHQ QUESTIONS 1-9: 0
SUM OF ALL RESPONSES TO PHQ QUESTIONS 1-9: 0
SUM OF ALL RESPONSES TO PHQ9 QUESTIONS 1 & 2: 0

## 2020-10-07 ASSESSMENT — ENCOUNTER SYMPTOMS
RHINORRHEA: 0
EYE DISCHARGE: 0
SHORTNESS OF BREATH: 0
CONSTIPATION: 0
CHEST TIGHTNESS: 0
BLOOD IN STOOL: 0
ABDOMINAL PAIN: 0
COUGH: 0
DIARRHEA: 0
SINUS PRESSURE: 0

## 2020-10-08 LAB
ALBUMIN SERPL-MCNC: 4.1 G/DL
ALP BLD-CCNC: 59 U/L
ALT SERPL-CCNC: 19 U/L
ANION GAP SERPL CALCULATED.3IONS-SCNC: 6 MMOL/L
AST SERPL-CCNC: 25 U/L
BASOPHILS ABSOLUTE: 0 /ΜL
BASOPHILS RELATIVE PERCENT: 0.5 %
BILIRUB SERPL-MCNC: 0.5 MG/DL (ref 0.1–1.4)
BUN BLDV-MCNC: 17 MG/DL
CALCIUM SERPL-MCNC: 9.5 MG/DL
CHLORIDE BLD-SCNC: 107 MMOL/L
CHOLESTEROL, TOTAL: 193 MG/DL
CHOLESTEROL/HDL RATIO: 3.4
CO2: 27 MMOL/L
CREAT SERPL-MCNC: 0.89 MG/DL
EOSINOPHILS ABSOLUTE: 0.1 /ΜL
EOSINOPHILS RELATIVE PERCENT: 0.8 %
GFR CALCULATED: >60
GLUCOSE BLD-MCNC: 95 MG/DL
HCT VFR BLD CALC: 38.8 % (ref 36–46)
HDLC SERPL-MCNC: 56 MG/DL (ref 35–70)
HEMOGLOBIN: 12.5 G/DL (ref 12–16)
LDL CHOLESTEROL CALCULATED: 123 MG/DL (ref 0–160)
LYMPHOCYTES ABSOLUTE: 4.1 /ΜL
LYMPHOCYTES RELATIVE PERCENT: 54.9 %
MCH RBC QN AUTO: 27.3 PG
MCHC RBC AUTO-ENTMCNC: 32.2 G/DL
MCV RBC AUTO: 85 FL
MONOCYTES ABSOLUTE: 0.3 /ΜL
MONOCYTES RELATIVE PERCENT: 4 %
NEUTROPHILS ABSOLUTE: 2.9 /ΜL
NEUTROPHILS RELATIVE PERCENT: 39.8 %
NONHDLC SERPL-MCNC: NORMAL MG/DL
PLATELET # BLD: 236 K/ΜL
PMV BLD AUTO: 9 FL
POTASSIUM SERPL-SCNC: 3.9 MMOL/L
RBC # BLD: 4.57 10^6/ΜL
SODIUM BLD-SCNC: 140 MMOL/L
TOTAL PROTEIN: 7
TRIGL SERPL-MCNC: 68 MG/DL
TSH SERPL DL<=0.05 MIU/L-ACNC: 2.06 UIU/ML
VLDLC SERPL CALC-MCNC: 14 MG/DL
WBC # BLD: 7.4 10^3/ML

## 2020-10-28 DIAGNOSIS — Z13.220 SCREENING, LIPID: ICD-10-CM

## 2020-10-28 DIAGNOSIS — Z00.00 ROUTINE GENERAL MEDICAL EXAMINATION AT A HEALTH CARE FACILITY: ICD-10-CM

## 2020-10-28 DIAGNOSIS — Z13.29 SCREENING FOR THYROID DISORDER: ICD-10-CM

## 2021-10-11 ENCOUNTER — HOSPITAL ENCOUNTER (OUTPATIENT)
Dept: MAMMOGRAPHY | Age: 66
Discharge: HOME | End: 2021-10-13
Payer: COMMERCIAL

## 2021-10-11 DIAGNOSIS — Z12.31 VISIT FOR SCREENING MAMMOGRAM: ICD-10-CM

## 2021-10-11 PROCEDURE — 77063 BREAST TOMOSYNTHESIS BI: CPT

## 2021-10-22 ENCOUNTER — OFFICE VISIT (OUTPATIENT)
Dept: PRIMARY CARE CLINIC | Age: 66
End: 2021-10-22
Payer: COMMERCIAL

## 2021-10-22 ENCOUNTER — HOSPITAL ENCOUNTER (OUTPATIENT)
Age: 66
Discharge: HOME | End: 2021-10-22
Payer: COMMERCIAL

## 2021-10-22 VITALS
OXYGEN SATURATION: 100 % | TEMPERATURE: 97 F | WEIGHT: 181 LBS | SYSTOLIC BLOOD PRESSURE: 131 MMHG | BODY MASS INDEX: 29.21 KG/M2 | HEART RATE: 73 BPM | DIASTOLIC BLOOD PRESSURE: 80 MMHG

## 2021-10-22 DIAGNOSIS — Z13.220 SCREENING, LIPID: ICD-10-CM

## 2021-10-22 DIAGNOSIS — Z11.59 ENCOUNTER FOR HEPATITIS C SCREENING TEST FOR LOW RISK PATIENT: ICD-10-CM

## 2021-10-22 DIAGNOSIS — Z11.4 SCREENING FOR HIV (HUMAN IMMUNODEFICIENCY VIRUS): ICD-10-CM

## 2021-10-22 DIAGNOSIS — Z13.29 SCREENING FOR THYROID DISORDER: ICD-10-CM

## 2021-10-22 DIAGNOSIS — Z79.899 MEDICATION MANAGEMENT: ICD-10-CM

## 2021-10-22 DIAGNOSIS — D22.9 SUSPICIOUS NEVUS: Primary | ICD-10-CM

## 2021-10-22 LAB
ANION GAP SERPL CALCULATED.3IONS-SCNC: 11 MMOL/L (ref 9–17)
BUN BLDV-MCNC: 14 MG/DL (ref 8–23)
BUN/CREAT BLD: ABNORMAL (ref 9–20)
CALCIUM SERPL-MCNC: 9.9 MG/DL (ref 8.6–10.4)
CHLORIDE BLD-SCNC: 103 MMOL/L (ref 98–107)
CO2: 24 MMOL/L (ref 20–31)
CREAT SERPL-MCNC: 0.81 MG/DL (ref 0.5–0.9)
GFR AFRICAN AMERICAN: >60 ML/MIN
GFR NON-AFRICAN AMERICAN: >60 ML/MIN
GFR SERPL CREATININE-BSD FRML MDRD: ABNORMAL ML/MIN/{1.73_M2}
GFR SERPL CREATININE-BSD FRML MDRD: ABNORMAL ML/MIN/{1.73_M2}
GLUCOSE BLD-MCNC: 104 MG/DL (ref 70–99)
HCT VFR BLD CALC: 42.4 % (ref 36.3–47.1)
HEMOGLOBIN: 12.6 G/DL (ref 11.9–15.1)
HEPATITIS C ANTIBODY: NONREACTIVE
HIV AG/AB: NONREACTIVE
MCH RBC QN AUTO: 26.8 PG (ref 25.2–33.5)
MCHC RBC AUTO-ENTMCNC: 29.7 G/DL (ref 28.4–34.8)
MCV RBC AUTO: 90.2 FL (ref 82.6–102.9)
NRBC AUTOMATED: 0 PER 100 WBC
PDW BLD-RTO: 13.5 % (ref 11.8–14.4)
PLATELET # BLD: 308 K/UL (ref 138–453)
PMV BLD AUTO: 10.7 FL (ref 8.1–13.5)
POTASSIUM SERPL-SCNC: 4.7 MMOL/L (ref 3.7–5.3)
RBC # BLD: 4.7 M/UL (ref 3.95–5.11)
SODIUM BLD-SCNC: 138 MMOL/L (ref 135–144)
TSH SERPL DL<=0.05 MIU/L-ACNC: 2.94 MIU/L (ref 0.3–5)
WBC # BLD: 7.5 K/UL (ref 3.5–11.3)

## 2021-10-22 PROCEDURE — 80048 BASIC METABOLIC PNL TOTAL CA: CPT

## 2021-10-22 PROCEDURE — 85027 COMPLETE CBC AUTOMATED: CPT

## 2021-10-22 PROCEDURE — 84443 ASSAY THYROID STIM HORMONE: CPT

## 2021-10-22 PROCEDURE — 87389 HIV-1 AG W/HIV-1&-2 AB AG IA: CPT

## 2021-10-22 PROCEDURE — G8427 DOCREV CUR MEDS BY ELIG CLIN: HCPCS | Performed by: NURSE PRACTITIONER

## 2021-10-22 PROCEDURE — 1036F TOBACCO NON-USER: CPT | Performed by: NURSE PRACTITIONER

## 2021-10-22 PROCEDURE — 3017F COLORECTAL CA SCREEN DOC REV: CPT | Performed by: NURSE PRACTITIONER

## 2021-10-22 PROCEDURE — 99213 OFFICE O/P EST LOW 20 MIN: CPT | Performed by: NURSE PRACTITIONER

## 2021-10-22 PROCEDURE — 36415 COLL VENOUS BLD VENIPUNCTURE: CPT

## 2021-10-22 PROCEDURE — G8419 CALC BMI OUT NRM PARAM NOF/U: HCPCS | Performed by: NURSE PRACTITIONER

## 2021-10-22 PROCEDURE — G8484 FLU IMMUNIZE NO ADMIN: HCPCS | Performed by: NURSE PRACTITIONER

## 2021-10-22 PROCEDURE — 86803 HEPATITIS C AB TEST: CPT

## 2021-10-22 SDOH — ECONOMIC STABILITY: FOOD INSECURITY: WITHIN THE PAST 12 MONTHS, THE FOOD YOU BOUGHT JUST DIDN'T LAST AND YOU DIDN'T HAVE MONEY TO GET MORE.: NEVER TRUE

## 2021-10-22 SDOH — ECONOMIC STABILITY: FOOD INSECURITY: WITHIN THE PAST 12 MONTHS, YOU WORRIED THAT YOUR FOOD WOULD RUN OUT BEFORE YOU GOT MONEY TO BUY MORE.: NEVER TRUE

## 2021-10-22 ASSESSMENT — PATIENT HEALTH QUESTIONNAIRE - PHQ9
SUM OF ALL RESPONSES TO PHQ9 QUESTIONS 1 & 2: 0
SUM OF ALL RESPONSES TO PHQ QUESTIONS 1-9: 0
1. LITTLE INTEREST OR PLEASURE IN DOING THINGS: 0
2. FEELING DOWN, DEPRESSED OR HOPELESS: 0
SUM OF ALL RESPONSES TO PHQ QUESTIONS 1-9: 0
SUM OF ALL RESPONSES TO PHQ QUESTIONS 1-9: 0

## 2021-10-22 ASSESSMENT — SOCIAL DETERMINANTS OF HEALTH (SDOH): HOW HARD IS IT FOR YOU TO PAY FOR THE VERY BASICS LIKE FOOD, HOUSING, MEDICAL CARE, AND HEATING?: NOT HARD AT ALL

## 2021-10-22 ASSESSMENT — ENCOUNTER SYMPTOMS
COUGH: 0
SHORTNESS OF BREATH: 0

## 2021-10-22 NOTE — PROGRESS NOTES
[facility]  [address], [address]  [phone]    Eryn Cuello is a 61 y.o. female who presents today for her  medical conditions/complaintsas noted below. Eryn Cuello is c/o of Other ( concerns mole on left leg been there for years   starting to feel pain and itchiness )  . HPI:     HPI     Mole to left inner leg. Been there since she was in her 19's. A year ago started to raise. Itchy and painful at times. Declines flu shot at this time      Nursing note reviewedand discussed with patient. Patient'smedications, allergies, past medical, surgical, social and family histories werereviewed and updated as appropriate. Current Outpatient Medications on File Prior to Visit   Medication Sig Dispense Refill    Multiple Vitamins-Minerals (ZINC PO) Take by mouth      MAGNESIUM PO Take by mouth      FISH OIL by Does not apply route.  vitamin B-12 (CYANOCOBALAMIN) 100 MCG tablet Take 50 mcg by mouth daily.  Multiple Vitamin (MULTIVITAMINS PO) Take  by mouth.  polyethylene glycol (GLYCOLAX) powder Use as directed by following your patient instructions given by office. (Patient not taking: Reported on 10/7/2020) 238 g 0     No current facility-administered medications on file prior to visit.      Past Medical History:   Diagnosis Date    Hyperlipidemia       Past Surgical History:   Procedure Laterality Date    COLONOSCOPY  9-    COLONOSCOPY N/A 5/18/2019    COLONOSCOPY SCREENING performed by Renetta Martin MD at [address] [facility]  10-    postmenopausal bleeding    ENDOMETRIAL BIOPSY  02/22/16    negative atypia or malignancy    FOOT SURGERY      bilateral    FOOT SURGERY  10 years ago    TUBAL LIGATION       Family History   Problem Relation Age of Onset    Diabetes Mother         borderline    High Blood Pressure Mother     Cervical Cancer Maternal Aunt     Diabetes Maternal Grandmother     Prostate Cancer Maternal Grandfather      Social History     Tobacco Use    Smoking status: Never Smoker    Smokeless tobacco: Never Used   Substance Use Topics    Alcohol use: No      No Known Allergies    Subjective:     Review of Systems   Constitutional: Negative for chills and fever. Respiratory: Negative for cough and shortness of breath. Cardiovascular: Negative for chest pain, palpitations and leg swelling. Objective:     /80 (Position: Sitting, Cuff Size: Medium Adult)   Pulse 73   Temp 97 °F (36.1 °C) (Temporal)   Wt 181 lb (82.1 kg)   LMP  (LMP Unknown)   SpO2 100%   BMI 29.21 kg/m²    Physical Exam  Constitutional:       Appearance: She is well-developed. HENT:      Right Ear: External ear normal.      Left Ear: External ear normal.      Nose: Nose normal.   Cardiovascular:      Rate and Rhythm: Normal rate and regular rhythm. Heart sounds: Normal heart sounds, S1 normal and S2 normal.   Pulmonary:      Effort: Pulmonary effort is normal. No respiratory distress. Breath sounds: Normal breath sounds. Musculoskeletal:         General: No deformity. Normal range of motion. Cervical back: Full passive range of motion without pain and normal range of motion. Skin:     General: Skin is warm and dry. Comments: Nevus- to left inner lower leg Irregular bordered, dark in color, color varies   Neurological:      Mental Status: She is alert and oriented to person, place, and time. Assessment/Plan         1. Suspicious nevus    - Sandip Sewell MD, Dermatology, [facility]    2. Screening for thyroid disorder    - TSH; Future    3. Screening, lipid      4. Encounter for hepatitis C screening test for low risk patient    - Hepatitis C Antibody; Future    5. Screening for HIV (human immunodeficiency virus)    - HIV Screen; Future    6. Medication management    - CBC; Future  - Basic Metabolic Panel;  Future    RTO if symptoms worsen or fail to improve  Pt agreeable with plan      Patient given educationalmaterials - see patient instructions. Discussed use, benefit, and side effectsof prescribed medications. All patient questions answered. Pt voiced understanding. Reviewed health maintenance. Instructed to continue current medications, diet andexercise. 1.  [place] received counseling on the following healthy behaviors: nutrition, exercise and medication adherence  2. Patient given educational materials when available - see patient instructionswhen applicable  3. Discussed use, benefit, and side effects of prescribed medications. Barriersto medication compliance addressed. All patient questions answered. Pt voicedunderstanding. 4.  Reviewed prior labs and health maintenance  5. Continuecurrent medications, diet and exercise. Completed Refills   Requested Prescriptions      No prescriptions requested or ordered in this encounter         This note was transcribed using dictation with Dragon services. Efforts were made to correct any errors but some words may be misinterpreted.     Electronically signed by JOE Multani CNP, CNP on 10/22/2021 at 8:40 AM

## 2022-03-17 ENCOUNTER — OFFICE VISIT (OUTPATIENT)
Dept: DERMATOLOGY | Age: 67
End: 2022-03-17
Payer: MEDICARE

## 2022-03-17 ENCOUNTER — HOSPITAL ENCOUNTER (OUTPATIENT)
Age: 67
Setting detail: SPECIMEN
Discharge: HOME | End: 2022-03-17

## 2022-03-17 VITALS
HEART RATE: 65 BPM | DIASTOLIC BLOOD PRESSURE: 74 MMHG | HEIGHT: 66 IN | WEIGHT: 183 LBS | SYSTOLIC BLOOD PRESSURE: 116 MMHG | OXYGEN SATURATION: 100 % | BODY MASS INDEX: 29.41 KG/M2 | TEMPERATURE: 97.4 F

## 2022-03-17 DIAGNOSIS — D48.5 NEOPLASM OF UNCERTAIN BEHAVIOR OF SKIN: Primary | ICD-10-CM

## 2022-03-17 PROCEDURE — 11102 TANGNTL BX SKIN SINGLE LES: CPT | Performed by: DERMATOLOGY

## 2022-03-17 RX ORDER — LIDOCAINE HYDROCHLORIDE AND EPINEPHRINE 10; 10 MG/ML; UG/ML
0.5 INJECTION, SOLUTION INFILTRATION; PERINEURAL ONCE
Status: COMPLETED | OUTPATIENT
Start: 2022-03-17 | End: 2022-03-17

## 2022-03-17 RX ADMIN — LIDOCAINE HYDROCHLORIDE AND EPINEPHRINE 0.5 ML: 10; 10 INJECTION, SOLUTION INFILTRATION; PERINEURAL at 15:59

## 2022-03-17 NOTE — PROGRESS NOTES
Dermatology Patient Note  [address]  [place] [id number]  Dept: [phone]  Dept Fax: [phone]      VISITDATE: 3/17/2022   REFERRING PROVIDER: Loraine Reeves, *      Lila Ng is a 74 y.o. female  who presents today in the office for:    New Patient (NO PH/FH of skincancer) and Lesion(s) (lt leg, has had it for years)      HISTORY OF PRESENT ILLNESS:  As above. Patient states the spot on her left calf has started to change. She hasn't had problem with it. In the past couple years, it started itching and aching. MEDICAL PROBLEMS:  Patient Active Problem List    Diagnosis Date Noted    Impacted cerumen of right ear 09/20/2017    Otalgia of right ear 09/20/2017    Chronic hypertrophic rhinitis 09/20/2017    Non morbid obesity due to excess calories 01/11/2017    Pruritus 04/18/2011    Ankle sprain 04/18/2011    Foot sprain 04/18/2011    Rhinitis 04/18/2011    Sleep disorder 04/18/2011       CURRENT MEDICATIONS:   Current Outpatient Medications   Medication Sig Dispense Refill    Multiple Vitamins-Minerals (ZINC PO) Take by mouth      MAGNESIUM PO Take by mouth      FISH OIL by Does not apply route.  vitamin B-12 (CYANOCOBALAMIN) 100 MCG tablet Take 50 mcg by mouth daily.  Multiple Vitamin (MULTIVITAMINS PO) Take  by mouth. No current facility-administered medications for this visit. ALLERGIES:   No Known Allergies    SOCIAL HISTORY:  Social History     Tobacco Use    Smoking status: Never Smoker    Smokeless tobacco: Never Used   Substance Use Topics    Alcohol use: No       Pertinent ROS:  Review of Systems  Skin: Denies any new changing, growing or bleeding lesions or rashes except as described in the HPI   Constitutional: Denies fevers, chills, and malaise.     PHYSICAL EXAM:   /74 (Site: Left Upper Arm, Position: Sitting, Cuff Size: Medium Adult)   Pulse 65   Temp 97.4 °F (36.3 °C) (Temporal)   Ht 5' 6\" (1.676 m)   Wt 183 lb (83 kg)   LMP  (LMP Unknown)   SpO2 100%   BMI 29.54 kg/m²     The patient is generally well appearing, well nourished, alert and conversational. Affect is normal.    Cutaneous Exam:  Physical Exam  Focused exam of left calf was performed    Facial covering was not removed during examination. Diagnoses/exam findings/medical history pertinent to this visit are listed below:    Assessment:   Diagnosis Orders   1. Seborrheic keratoses          Plan:  Neoplasm uncertain behavior of skin of left lower leg  Ddx: SK r/o SCCIS  Shave Biopsy: The procedure and its risks were explained including but not limited to pain, bleeding, infection, permanent scar, permanent pigment alteration and need for an additional procedure. Consent to proceed with the procedure was obtained from the patient or the parent. After cleaning with alcohol the lesion was anesthetized with 1% lidocaine with epinephrine and was removed with a dermablade. Hemostasis was achieved with aluminum chloride and Vaseline and a bandage were applied. RTC prn    No future appointments. Patient Instructions   BIOPSY WOUND CARE    A biopsy is where a small piece of skin tissue is removed and examined by a pathologist.  When a biopsy is done, there is a small wound site that requires proper care to prevent infection and scarring. Some biopsies require sutures and their removal.    How to Care for Biopsy Wound    A.  Leave band-aid or dressing on for 24 hours. B. Wash two times a day with soap and water. C.  Let the wound air dry, then apply Vaseline ointment and cover with a Band-Aid       unless otherwise instructed by your provider. D. If there is slight discomfort, you may give acetaminophen or ibuprofen. When To Call the Doctor    Call the Dermatology Clinic or your doctor if any of the following occur:    A. Redness and swelling  B.   Tenderness and warm to touch  C.  Drainage from wound  D. Fever    Biopsy Results    Biopsy results are usually available in 1-2 weeks. We provide biopsy results in letters or via MyChart for benign results or we will call for any concerning results. If you have not heard from our staff please call the office within 2 weeks. Please call our office with any concerns at [phone]. This note was created with the assistance of a speech-recognition program.  Although the intention is to generate a document that actually reflects the content of the visit, no guarantees can be provided that every mistake has been identified and corrected by editing.     Electronically signed by Lynne Rawls MD on 3/17/22 at 9:18 AM EDT

## 2022-03-17 NOTE — PATIENT INSTRUCTIONS

## 2022-03-22 LAB — DERMATOLOGY PATHOLOGY REPORT: NORMAL

## 2022-08-10 ENCOUNTER — OFFICE VISIT (OUTPATIENT)
Dept: PRIMARY CARE CLINIC | Age: 67
End: 2022-08-10
Payer: MEDICARE

## 2022-08-10 VITALS
BODY MASS INDEX: 28.77 KG/M2 | HEART RATE: 66 BPM | DIASTOLIC BLOOD PRESSURE: 79 MMHG | WEIGHT: 179 LBS | OXYGEN SATURATION: 99 % | SYSTOLIC BLOOD PRESSURE: 118 MMHG | TEMPERATURE: 97 F | HEIGHT: 66 IN

## 2022-08-10 DIAGNOSIS — Z00.00 WELCOME TO MEDICARE PREVENTIVE VISIT: Primary | ICD-10-CM

## 2022-08-10 PROCEDURE — 3017F COLORECTAL CA SCREEN DOC REV: CPT | Performed by: NURSE PRACTITIONER

## 2022-08-10 PROCEDURE — 1123F ACP DISCUSS/DSCN MKR DOCD: CPT | Performed by: NURSE PRACTITIONER

## 2022-08-10 PROCEDURE — G0402 INITIAL PREVENTIVE EXAM: HCPCS | Performed by: NURSE PRACTITIONER

## 2022-08-10 SDOH — HEALTH STABILITY: PHYSICAL HEALTH: ON AVERAGE, HOW MANY DAYS PER WEEK DO YOU ENGAGE IN MODERATE TO STRENUOUS EXERCISE (LIKE A BRISK WALK)?: 6 DAYS

## 2022-08-10 SDOH — HEALTH STABILITY: PHYSICAL HEALTH: ON AVERAGE, HOW MANY MINUTES DO YOU ENGAGE IN EXERCISE AT THIS LEVEL?: 110 MIN

## 2022-08-10 ASSESSMENT — LIFESTYLE VARIABLES
HOW OFTEN DO YOU HAVE A DRINK CONTAINING ALCOHOL: 1
HOW OFTEN DO YOU HAVE SIX OR MORE DRINKS ON ONE OCCASION: 1
HOW OFTEN DO YOU HAVE A DRINK CONTAINING ALCOHOL: NEVER
HOW MANY STANDARD DRINKS CONTAINING ALCOHOL DO YOU HAVE ON A TYPICAL DAY: PATIENT DOES NOT DRINK
HOW MANY STANDARD DRINKS CONTAINING ALCOHOL DO YOU HAVE ON A TYPICAL DAY: 0

## 2022-08-10 ASSESSMENT — PATIENT HEALTH QUESTIONNAIRE - PHQ9
SUM OF ALL RESPONSES TO PHQ9 QUESTIONS 1 & 2: 0
1. LITTLE INTEREST OR PLEASURE IN DOING THINGS: 0
SUM OF ALL RESPONSES TO PHQ QUESTIONS 1-9: 0
SUM OF ALL RESPONSES TO PHQ QUESTIONS 1-9: 0
2. FEELING DOWN, DEPRESSED OR HOPELESS: 0
SUM OF ALL RESPONSES TO PHQ QUESTIONS 1-9: 0
SUM OF ALL RESPONSES TO PHQ QUESTIONS 1-9: 0

## 2022-08-10 NOTE — PROGRESS NOTES
Visit Information    Have you changed or started any medications since your last visit including any over-the-counter medicines, vitamins, or herbal medicines? no   Are you having any side effects from any of your medications? -  no  Have you stopped taking any of your medications? Is so, why? -  no    Have you seen any other physician or provider since your last visit? No  Have you had any other diagnostic tests since your last visit? No  Have you been seen in the emergency room and/or had an admission to a hospital since we last saw you? No  Have you had your routine dental cleaning in the past 6 months? no    Have you activated your [employer] account? If not, what are your barriers?  Yes     Patient Care Team:  JOE Castaneda CNP as PCP - General (Family Nurse Practitioner)  JOE Castaneda CNP as PCP - [facility] Provider    Medical History Review  Past Medical, Family, and Social History reviewed and does not contribute to the patient presenting condition    Health Maintenance   Topic Date Due    Annual Wellness Visit (AWV)  Never done    Diabetes screen  Never done    Shingles vaccine (1 of 2) Never done    DEXA (modify frequency per FRAX score)  Never done    Cervical cancer screen  02/15/2020    Pneumococcal 65+ years Vaccine (1 - PCV) Never done    COVID-19 Vaccine (4 - Booster for Pfizer series) 04/01/2022    Flu vaccine (1) 09/01/2022    Depression Screen  08/10/2023    Breast cancer screen  10/11/2023    Lipids  10/08/2025    DTaP/Tdap/Td vaccine (2 - Td or Tdap) 01/13/2027    Colorectal Cancer Screen  05/18/2029    Hepatitis C screen  Completed    HIV screen  Completed    Hepatitis A vaccine  Aged Out    Hepatitis B vaccine  Aged Out    Hib vaccine  Aged Out    Meningococcal (ACWY) vaccine  Aged Out

## 2022-08-10 NOTE — PATIENT INSTRUCTIONS
Personalized Preventive Plan for [facility] - 8/10/2022  Medicare offers a range of preventive health benefits. Some of the tests and screenings are paid in full while other may be subject to a deductible, co-insurance, and/or copay. Some of these benefits include a comprehensive review of your medical history including lifestyle, illnesses that may run in your family, and various assessments and screenings as appropriate. After reviewing your medical record and screening and assessments performed today your provider may have ordered immunizations, labs, imaging, and/or referrals for you. A list of these orders (if applicable) as well as your Preventive Care list are included within your After Visit Summary for your review. Other Preventive Recommendations:    A preventive eye exam performed by an eye specialist is recommended every 1-2 years to screen for glaucoma; cataracts, macular degeneration, and other eye disorders. A preventive dental visit is recommended every 6 months. Try to get at least 150 minutes of exercise per week or 10,000 steps per day on a pedometer . Order or download the FREE \"Exercise & Physical Activity: Your Everyday Guide\" from [employer]. Call 4-313.287.4714 or search [employer] online. You need 9595-4463 mg of calcium and 8495-5664 IU of vitamin D per day. It is possible to meet your calcium requirement with diet alone, but a vitamin D supplement is usually necessary to meet this goal.  When exposed to the sun, use a sunscreen that protects against both UVA and UVB radiation with an SPF of 30 or greater. Reapply every 2 to 3 hours or after sweating, drying off with a towel, or swimming. Always wear a seat belt when traveling in a car. Always wear a helmet when riding a bicycle or motorcycle.

## 2022-08-10 NOTE — PROGRESS NOTES
Medicare Annual Wellness Visit    Jai Ramirez is here for Medicare AWV    Assessment & Plan   Welcome to Medicare preventive visit    Recommendations for Preventive Services Due: see orders and patient instructions/AVS.  Recommended screening schedule for the next 5-10 years is provided to the patient in written form: see Patient Instructions/AVS.     Return for Medicare Annual Wellness Visit in 1 year. Subjective       Patient's complete Health Risk Assessment and screening values have been reviewed and are found in Flowsheets. The following problems were reviewed today and where indicated follow up appointments were made and/or referrals ordered. Had dexa years ago.    Up to date on pap  She gets her vaccines from health department because thinks she is up to date     Positive Risk Factor Screenings with Interventions:             General Health and ACP:  General  In general, how would you say your health is?: Very Good  In the past 7 days, have you experienced any of the following: New or Increased Pain, New or Increased Fatigue, Loneliness, Social Isolation, Stress or Anger?: No  Do you get the social and emotional support that you need?: Yes  Do you have a Living Will?: Yes    Advance Directives       Power of  Living Will ACP-Advance Directive ACP-Power of     Not on File Not on File Not on File Not on File        General Health Risk Interventions:    Health Habits/Nutrition:  Physical Activity: Sufficiently Active    Days of Exercise per Week: 6 days    Minutes of Exercise per Session: 110 min     Have you lost any weight without trying in the past 3 months?: No  Body mass index: (!) 28.89  Have you seen the dentist within the past year?: (!) No  Health Habits/Nutrition Interventions:  Dental exam overdue:  patient encouraged to make appointment with his/her dentist    Hearing/Vision:  Do you or your family notice any trouble with your hearing that hasn't been managed with hearing aids?: No  Do you have difficulty driving, watching TV, or doing any of your daily activities because of your eyesight?: (!) Yes  Have you had an eye exam within the past year?: Yes  No results found. Hearing/Vision Interventions:  Vision concerns:  follows with eye doctor every 3-6 months. next apt is in October. Objective   Vitals:    08/10/22 0859   BP: 118/79   Pulse: 66   Temp: 97 °F (36.1 °C)   TempSrc: Temporal   SpO2: 99%   Weight: 179 lb (81.2 kg)   Height: 5' 6\" (1.676 m)      Body mass index is 28.89 kg/m². No Known Allergies  Prior to Visit Medications    Medication Sig Taking? Authorizing Provider   Multiple Vitamins-Minerals (ZINC PO) Take by mouth Yes Historical Provider, MD   MAGNESIUM PO Take by mouth Yes Historical Provider, MD [address] by Does not apply route. Yes Historical Provider, MD   Multiple Vitamin (MULTIVITAMINS PO) Take  by mouth. Yes Historical Provider, MD   vitamin B-12 (CYANOCOBALAMIN) 100 MCG tablet Take 50 mcg by mouth daily. Historical Provider, MD [place] (Including outside providers/suppliers regularly involved in providing care):   Patient Care Team:  JOE Mayes CNP as PCP - General (Family Nurse Practitioner)  JOE Mayes CNP as PCP - [facility] Empaneled Provider     Reviewed and updated this visit:  Tobacco  Allergies  Meds  Med Hx  Surg Hx  Soc Hx  Fam Hx             Medicare Annual Wellness Visit    Laychip Campos is here for Medicare AWV    Assessment & Plan   Welcome to Medicare preventive visit    Recommendations for Preventive Services Due: see orders and patient instructions/AVS.  Recommended screening schedule for the next 5-10 years is provided to the patient in written form: see Patient Instructions/AVS.     Return for Medicare Annual Wellness Visit in 1 year. Subjective       Patient's complete Health Risk Assessment and screening values have been reviewed and are found in Flowsheets.  The following problems were reviewed today and where indicated follow up appointments were made and/or referrals ordered. Positive Risk Factor Screenings with Interventions:             General Health and ACP:  General  In general, how would you say your health is?: Very Good  In the past 7 days, have you experienced any of the following: New or Increased Pain, New or Increased Fatigue, Loneliness, Social Isolation, Stress or Anger?: No  Do you get the social and emotional support that you need?: Yes  Do you have a Living Will?: Yes    Advance Directives       Power of  Living Will ACP-Advance Directive ACP-Power of     Not on File Not on File Not on File Not on File          General Health Risk Interventions:  na    Health Habits/Nutrition:  Physical Activity: Sufficiently Active    Days of Exercise per Week: 6 days    Minutes of Exercise per Session: 110 min     Have you lost any weight without trying in the past 3 months?: No  Body mass index: (!) 28.89  Have you seen the dentist within the past year?: (!) No  Health Habits/Nutrition Interventions:  Dental exam overdue:  patient encouraged to make appointment with his/her dentist    Hearing/Vision:  Do you or your family notice any trouble with your hearing that hasn't been managed with hearing aids?: No  Do you have difficulty driving, watching TV, or doing any of your daily activities because of your eyesight?: (!) Yes  Have you had an eye exam within the past year?: Yes  No results found. Hearing/Vision Interventions:  Pt follows with eye doctor every 3-6 months. Due in October. Objective   Vitals:    08/10/22 0859   BP: 118/79   Pulse: 66   Temp: 97 °F (36.1 °C)   TempSrc: Temporal   SpO2: 99%   Weight: 179 lb (81.2 kg)   Height: 5' 6\" (1.676 m)      Body mass index is 28.89 kg/m². No Known Allergies  Prior to Visit Medications    Medication Sig Taking?  Authorizing Provider   Multiple Vitamins-Minerals (ZINC PO) Take by mouth Yes Historical Provider, MD   MAGNESIUM PO Take by mouth Yes Historical Provider, MD   [address]  by Does not apply route. Yes Historical Provider, MD   Multiple Vitamin (MULTIVITAMINS PO) Take  by mouth. Yes Historical Provider, MD   vitamin B-12 (CYANOCOBALAMIN) 100 MCG tablet Take 50 mcg by mouth daily.   Historical Provider, MD Ramam (Including outside providers/suppliers regularly involved in providing care):   Patient Care Team:  JOE Beck CNP as PCP - General (Family Nurse Practitioner)  JEO Beck CNP as PCP - [facility] Empaneled Provider     Reviewed and updated this visit:  Tobacco  Allergies  Meds  Med Hx  Surg Hx  Soc Hx  Fam Hx

## 2022-12-08 ENCOUNTER — HOSPITAL ENCOUNTER (OUTPATIENT)
Dept: MAMMOGRAPHY | Age: 67
Discharge: HOME | End: 2022-12-10
Payer: MEDICARE

## 2022-12-08 ENCOUNTER — HOSPITAL ENCOUNTER (OUTPATIENT)
Age: 67
Discharge: HOME | End: 2022-12-10
Payer: MEDICARE

## 2022-12-08 DIAGNOSIS — Z12.31 ENCOUNTER FOR SCREENING MAMMOGRAM FOR MALIGNANT NEOPLASM OF BREAST: ICD-10-CM

## 2022-12-08 DIAGNOSIS — Z00.00 WELCOME TO MEDICARE PREVENTIVE VISIT: ICD-10-CM

## 2022-12-08 LAB
ALBUMIN SERPL-MCNC: 4.2 G/DL (ref 3.5–5.2)
ALBUMIN/GLOBULIN RATIO: 1.3 (ref 1–2.5)
ALP BLD-CCNC: 72 U/L (ref 35–104)
ALT SERPL-CCNC: 19 U/L (ref 5–33)
ANION GAP SERPL CALCULATED.3IONS-SCNC: 13 MMOL/L (ref 9–17)
AST SERPL-CCNC: 29 U/L
BILIRUB SERPL-MCNC: 0.4 MG/DL (ref 0.3–1.2)
BUN BLDV-MCNC: 11 MG/DL (ref 8–23)
CALCIUM SERPL-MCNC: 9.6 MG/DL (ref 8.6–10.4)
CHLORIDE BLD-SCNC: 106 MMOL/L (ref 98–107)
CO2: 24 MMOL/L (ref 20–31)
CREAT SERPL-MCNC: 0.91 MG/DL (ref 0.5–0.9)
GFR SERPL CREATININE-BSD FRML MDRD: >60 ML/MIN/1.73M2
GLUCOSE BLD-MCNC: 98 MG/DL (ref 70–99)
HCT VFR BLD CALC: 42.9 % (ref 36.3–47.1)
HEMOGLOBIN: 13.3 G/DL (ref 11.9–15.1)
MCH RBC QN AUTO: 27.2 PG (ref 25.2–33.5)
MCHC RBC AUTO-ENTMCNC: 31 G/DL (ref 28.4–34.8)
MCV RBC AUTO: 87.7 FL (ref 82.6–102.9)
NRBC AUTOMATED: 0 PER 100 WBC
PDW BLD-RTO: 13.8 % (ref 11.8–14.4)
PLATELET # BLD: 291 K/UL (ref 138–453)
PMV BLD AUTO: 10.7 FL (ref 8.1–13.5)
POTASSIUM SERPL-SCNC: 4.3 MMOL/L (ref 3.7–5.3)
RBC # BLD: 4.89 M/UL (ref 3.95–5.11)
SODIUM BLD-SCNC: 143 MMOL/L (ref 135–144)
TOTAL PROTEIN: 7.4 G/DL (ref 6.4–8.3)
TSH SERPL DL<=0.05 MIU/L-ACNC: 2.71 UIU/ML (ref 0.3–5)
WBC # BLD: 7 K/UL (ref 3.5–11.3)

## 2022-12-08 PROCEDURE — 80053 COMPREHEN METABOLIC PANEL: CPT

## 2022-12-08 PROCEDURE — 85027 COMPLETE CBC AUTOMATED: CPT

## 2022-12-08 PROCEDURE — 36415 COLL VENOUS BLD VENIPUNCTURE: CPT

## 2022-12-08 PROCEDURE — 77063 BREAST TOMOSYNTHESIS BI: CPT

## 2022-12-08 PROCEDURE — 84443 ASSAY THYROID STIM HORMONE: CPT

## 2023-08-30 ENCOUNTER — OFFICE VISIT (OUTPATIENT)
Dept: PRIMARY CARE CLINIC | Age: 68
End: 2023-08-30

## 2023-08-30 VITALS
HEIGHT: 66 IN | WEIGHT: 176.6 LBS | BODY MASS INDEX: 28.38 KG/M2 | SYSTOLIC BLOOD PRESSURE: 128 MMHG | OXYGEN SATURATION: 100 % | HEART RATE: 60 BPM | DIASTOLIC BLOOD PRESSURE: 75 MMHG

## 2023-08-30 DIAGNOSIS — F43.9 SITUATIONAL STRESS: ICD-10-CM

## 2023-08-30 DIAGNOSIS — Z13.220 SCREENING FOR HYPERLIPIDEMIA: ICD-10-CM

## 2023-08-30 DIAGNOSIS — Z71.89 ACP (ADVANCE CARE PLANNING): ICD-10-CM

## 2023-08-30 DIAGNOSIS — Z13.29 SCREENING FOR THYROID DISORDER: ICD-10-CM

## 2023-08-30 DIAGNOSIS — Z00.00 ENCOUNTER FOR WELL ADULT EXAM WITHOUT ABNORMAL FINDINGS: Primary | ICD-10-CM

## 2023-08-30 SDOH — ECONOMIC STABILITY: FOOD INSECURITY: WITHIN THE PAST 12 MONTHS, THE FOOD YOU BOUGHT JUST DIDN'T LAST AND YOU DIDN'T HAVE MONEY TO GET MORE.: NEVER TRUE

## 2023-08-30 SDOH — ECONOMIC STABILITY: INCOME INSECURITY: HOW HARD IS IT FOR YOU TO PAY FOR THE VERY BASICS LIKE FOOD, HOUSING, MEDICAL CARE, AND HEATING?: NOT HARD AT ALL

## 2023-08-30 SDOH — ECONOMIC STABILITY: FOOD INSECURITY: WITHIN THE PAST 12 MONTHS, YOU WORRIED THAT YOUR FOOD WOULD RUN OUT BEFORE YOU GOT MONEY TO BUY MORE.: NEVER TRUE

## 2023-08-30 SDOH — ECONOMIC STABILITY: HOUSING INSECURITY
IN THE LAST 12 MONTHS, WAS THERE A TIME WHEN YOU DID NOT HAVE A STEADY PLACE TO SLEEP OR SLEPT IN A SHELTER (INCLUDING NOW)?: NO

## 2023-08-30 ASSESSMENT — ENCOUNTER SYMPTOMS
CONSTIPATION: 0
EYE DISCHARGE: 0
SHORTNESS OF BREATH: 0
ABDOMINAL PAIN: 0
SINUS PRESSURE: 0
RHINORRHEA: 0
CHEST TIGHTNESS: 0
DIARRHEA: 0
COUGH: 0
BLOOD IN STOOL: 0

## 2023-08-30 ASSESSMENT — PATIENT HEALTH QUESTIONNAIRE - PHQ9
SUM OF ALL RESPONSES TO PHQ QUESTIONS 1-9: 4
5. POOR APPETITE OR OVEREATING: 0
6. FEELING BAD ABOUT YOURSELF - OR THAT YOU ARE A FAILURE OR HAVE LET YOURSELF OR YOUR FAMILY DOWN: 1
8. MOVING OR SPEAKING SO SLOWLY THAT OTHER PEOPLE COULD HAVE NOTICED. OR THE OPPOSITE, BEING SO FIGETY OR RESTLESS THAT YOU HAVE BEEN MOVING AROUND A LOT MORE THAN USUAL: 0
SUM OF ALL RESPONSES TO PHQ QUESTIONS 1-9: 4
2. FEELING DOWN, DEPRESSED OR HOPELESS: 3
4. FEELING TIRED OR HAVING LITTLE ENERGY: 0
10. IF YOU CHECKED OFF ANY PROBLEMS, HOW DIFFICULT HAVE THESE PROBLEMS MADE IT FOR YOU TO DO YOUR WORK, TAKE CARE OF THINGS AT HOME, OR GET ALONG WITH OTHER PEOPLE: 0
SUM OF ALL RESPONSES TO PHQ QUESTIONS 1-9: 4
7. TROUBLE CONCENTRATING ON THINGS, SUCH AS READING THE NEWSPAPER OR WATCHING TELEVISION: 0
1. LITTLE INTEREST OR PLEASURE IN DOING THINGS: 0
9. THOUGHTS THAT YOU WOULD BE BETTER OFF DEAD, OR OF HURTING YOURSELF: 0
3. TROUBLE FALLING OR STAYING ASLEEP: 0
SUM OF ALL RESPONSES TO PHQ9 QUESTIONS 1 & 2: 3
SUM OF ALL RESPONSES TO PHQ QUESTIONS 1-9: 4

## 2023-08-30 NOTE — PROGRESS NOTES
Well Adult Note  Name: Luther Aguilar Date: 2023   MRN: [id number] Sex: Female   Age: 79 y.o. Ethnicity: Non- / Non    : 1955 Race: Black / Kristel Mcqueen is here for well adult exam.  Pt states she had a bone scan years ago- takes calcium  Doesn't want pneumonia vaccine- [place] [facility] [place] [facility] [place]  Has had a falling out with daughter. Would like to see a counselor. Review of Systems   Constitutional:  Negative for activity change, appetite change, chills, fatigue and fever. HENT:  Negative for congestion, ear pain, rhinorrhea and sinus pressure. Eyes:  Negative for discharge and visual disturbance. Respiratory:  Negative for cough, chest tightness and shortness of breath. Cardiovascular:  Negative for chest pain, palpitations and leg swelling. Gastrointestinal:  Negative for abdominal pain, blood in stool, constipation and diarrhea. Endocrine: Negative for cold intolerance and heat intolerance. Genitourinary:  Negative for difficulty urinating and hematuria. Musculoskeletal:  Negative for arthralgias and myalgias. Skin:  Negative for rash. Neurological:  Negative for dizziness, light-headedness and headaches. Psychiatric/Behavioral:  Negative for dysphoric mood and self-injury. No Known Allergies      Prior to Visit Medications    Medication Sig Taking? Authorizing Provider   Multiple Vitamins-Minerals (ZINC PO) Take by mouth Yes Historical Provider, MD   MAGNESIUM PO Take by mouth Yes Historical Provider, MD   [address] [place] [address] by Does not apply route. Yes Historical Provider, MD   vitamin B-12 (CYANOCOBALAMIN) 100 MCG tablet Take 0.5 tablets by mouth daily Yes Historical Provider, MD   Multiple Vitamin (MULTIVITAMINS PO) Take  by mouth.  Yes Historical Provider, MD         Past Medical History:   Diagnosis Date    Hyperlipidemia        Past Surgical History:   Procedure Laterality Date

## 2023-08-30 NOTE — PATIENT INSTRUCTIONS
Advance Directives: Care Instructions  Overview  An advance directive is a legal way to state your wishes at the end of your life. It tells your family and your doctor what to do if you can't say what you want. There are two main types of advance directives. You can change them any time your wishes change. Living will. This form tells your family and your doctor your wishes about life support and other treatment. The form is also called a declaration. Medical power of . This form lets you name a person to make treatment decisions for you when you can't speak for yourself. This person is called a health care agent (health care proxy, health care surrogate). The form is also called a durable power of  for health care. If you do not have an advance directive, decisions about your medical care may be made by a family member, or by a doctor or a  who doesn't know you. It may help to think of an advance directive as a gift to the people who care for you. If you have one, they won't have to make tough decisions by themselves. For more information, including forms for your state, see the [address] [place] [address] website (www.caringinfo.org/planning/advance-directives/). Follow-up care is a key part of your treatment and safety. Be sure to make and go to all appointments, and call your doctor if you are having problems. It's also a good idea to know your test results and keep a list of the medicines you take. What should you include in an advance directive? Many states have a unique advance directive form. (It may ask you to address specific issues.) Or you might use a universal form that's approved by many states. If your form doesn't tell you what to address, it may be hard to know what to include in your advance directive. Use the questions below to help you get started. Who do you want to make decisions about your medical care if you are not able to?   What life-support measures do you want if you

## 2023-12-19 PROBLEM — F43.22 ADJUSTMENT DISORDER WITH ANXIOUS MOOD: Status: ACTIVE | Noted: 2023-12-19

## 2023-12-29 ENCOUNTER — HOSPITAL ENCOUNTER (OUTPATIENT)
Age: 68
Discharge: HOME | End: 2023-12-29
Payer: MEDICARE

## 2023-12-29 DIAGNOSIS — Z13.220 SCREENING FOR HYPERLIPIDEMIA: ICD-10-CM

## 2023-12-29 DIAGNOSIS — Z00.00 ENCOUNTER FOR WELL ADULT EXAM WITHOUT ABNORMAL FINDINGS: ICD-10-CM

## 2023-12-29 DIAGNOSIS — Z13.29 SCREENING FOR THYROID DISORDER: ICD-10-CM

## 2023-12-29 LAB
ALBUMIN SERPL-MCNC: 4.2 G/DL (ref 3.5–5.2)
ALBUMIN/GLOB SERPL: 1 {RATIO} (ref 1–2.5)
ALP SERPL-CCNC: 79 U/L (ref 35–104)
ALT SERPL-CCNC: 32 U/L (ref 10–35)
ANION GAP SERPL CALCULATED.3IONS-SCNC: 8 MMOL/L (ref 9–16)
AST SERPL-CCNC: 35 U/L (ref 10–35)
BILIRUB SERPL-MCNC: 0.4 MG/DL (ref 0–1.2)
BUN SERPL-MCNC: 13 MG/DL (ref 8–23)
CALCIUM SERPL-MCNC: 9.6 MG/DL (ref 8.6–10.4)
CHLORIDE SERPL-SCNC: 108 MMOL/L (ref 98–107)
CHOLEST SERPL-MCNC: 235 MG/DL (ref 0–199)
CHOLESTEROL/HDL RATIO: 4
CO2 SERPL-SCNC: 28 MMOL/L (ref 20–31)
CREAT SERPL-MCNC: 1 MG/DL (ref 0.5–0.9)
ERYTHROCYTE [DISTWIDTH] IN BLOOD BY AUTOMATED COUNT: 13.4 % (ref 11.8–14.4)
GFR SERPL CREATININE-BSD FRML MDRD: >60 ML/MIN/1.73M2
GLUCOSE SERPL-MCNC: 110 MG/DL (ref 74–99)
HCT VFR BLD AUTO: 42.9 % (ref 36.3–47.1)
HDLC SERPL-MCNC: 54 MG/DL
HGB BLD-MCNC: 13.4 G/DL (ref 11.9–15.1)
LDLC SERPL CALC-MCNC: 160 MG/DL (ref 0–100)
MCH RBC QN AUTO: 27.6 PG (ref 25.2–33.5)
MCHC RBC AUTO-ENTMCNC: 31.2 G/DL (ref 28.4–34.8)
MCV RBC AUTO: 88.5 FL (ref 82.6–102.9)
NRBC BLD-RTO: 0 PER 100 WBC
PLATELET # BLD AUTO: 284 K/UL (ref 138–453)
PMV BLD AUTO: 10.7 FL (ref 8.1–13.5)
POTASSIUM SERPL-SCNC: 4.5 MMOL/L (ref 3.7–5.3)
PROT SERPL-MCNC: 7.1 G/DL (ref 6.6–8.7)
RBC # BLD AUTO: 4.85 M/UL (ref 3.95–5.11)
SODIUM SERPL-SCNC: 144 MMOL/L (ref 136–145)
TRIGL SERPL-MCNC: 108 MG/DL (ref 0–149)
TSH SERPL DL<=0.05 MIU/L-ACNC: 4.21 UIU/ML (ref 0.27–4.2)
VLDLC SERPL CALC-MCNC: 22 MG/DL
WBC OTHER # BLD: 7.2 K/UL (ref 3.5–11.3)

## 2023-12-29 PROCEDURE — 84443 ASSAY THYROID STIM HORMONE: CPT

## 2023-12-29 PROCEDURE — 80053 COMPREHEN METABOLIC PANEL: CPT

## 2023-12-29 PROCEDURE — 36415 COLL VENOUS BLD VENIPUNCTURE: CPT

## 2023-12-29 PROCEDURE — 85027 COMPLETE CBC AUTOMATED: CPT

## 2023-12-29 PROCEDURE — 80061 LIPID PANEL: CPT

## 2024-01-09 ENCOUNTER — OFFICE VISIT (OUTPATIENT)
Dept: BEHAVIORAL/MENTAL HEALTH CLINIC | Age: 69
End: 2024-01-09
Payer: MEDICARE

## 2024-01-09 DIAGNOSIS — F43.22 ADJUSTMENT DISORDER WITH ANXIOUS MOOD: Primary | ICD-10-CM

## 2024-01-09 PROCEDURE — 90834 PSYTX W PT 45 MINUTES: CPT | Performed by: COUNSELOR

## 2024-01-09 PROCEDURE — 1123F ACP DISCUSS/DSCN MKR DOCD: CPT | Performed by: COUNSELOR

## 2024-01-09 NOTE — PROGRESS NOTES
follow-up.    Diagnoses and all orders for this visit:    Adjustment disorder with anxious mood        INTERVENTION  Trained in strategies for increasing balanced thinking, Discussed and set plan for behavioral activation, Trained in relaxation strategies, Trained in improving communication skills, Provided education, Discussed self-care (sleep, nutrition, rewarding activities, social support, exercise), Motivational Interviewing to determine importance and readiness for change, Discussed potential barriers to change, Established rapport, [place]-setting to identify pt's primary goals for [facility] visit / overall health, Supportive techniques, and Motivational Interviewing to target pt's barriers to change behaviors.    PLAN  Encouraged to work on boundaries, work on communication skills, and confront maladaptive thinking. She requested to go monthly.     INTERACTIVE COMPLEXITY  Is interactive complexity present?  No  Reason:    Additional Supporting Information:  N/A     Electronically signed by Tomeka Bruner on 1/9/2024 at 9:52 AM

## 2024-01-22 NOTE — PROGRESS NOTES
10/7/2020    Jay Garcia (:  1955) is a 63 y.o. female, here for a preventive medicine evaluation. Patient Active Problem List   Diagnosis    Pruritus    Ankle sprain    Foot sprain    Rhinitis    Sleep disorder    Non morbid obesity due to excess calories    Impacted cerumen of right ear    Otalgia of right ear    Chronic hypertrophic rhinitis       Review of Systems   Constitutional: Negative for activity change, appetite change, chills, fatigue and fever. HENT: Negative for congestion, ear pain, rhinorrhea and sinus pressure. Eyes: Negative for discharge and visual disturbance. Respiratory: Negative for cough, chest tightness and shortness of breath. Cardiovascular: Negative for chest pain, palpitations and leg swelling. Gastrointestinal: Negative for abdominal pain, blood in stool, constipation and diarrhea. Endocrine: Negative for cold intolerance and heat intolerance. Genitourinary: Negative for difficulty urinating and hematuria. Musculoskeletal: Negative for arthralgias and myalgias. Skin: Negative for rash. Neurological: Negative for dizziness, light-headedness and headaches. Psychiatric/Behavioral: Negative for dysphoric mood and self-injury. Prior to Visit Medications    Medication Sig Taking? Authorizing Provider   Multiple Vitamins-Minerals (ZINC PO) Take by mouth Yes Historical Provider, MD   MAGNESIUM PO Take by mouth Yes Historical Provider, MD [address] by Does not apply route. Yes Historical Provider, MD   vitamin B-12 (CYANOCOBALAMIN) 100 MCG tablet Take 50 mcg by mouth daily. Yes Historical Provider, MD   Multiple Vitamin (MULTIVITAMINS PO) Take  by mouth. Yes Historical Provider, MD   polyethylene glycol (GLYCOLAX) powder Use as directed by following your patient instructions given by office.   Patient not taking: Reported on 10/7/2020  Dion Aguirre MD        No Known Allergies    Past Medical History:   Diagnosis Date    Hyperlipidemia        Past Surgical History:   Procedure Laterality Date    COLONOSCOPY  9-    COLONOSCOPY N/A 5/18/2019    COLONOSCOPY SCREENING performed by Shy Garg MD at [address]  10-    postmenopausal bleeding    ENDOMETRIAL BIOPSY  02/22/16    negative atypia or malignancy    FOOT SURGERY      bilateral    FOOT SURGERY  10 years ago    TUBAL LIGATION         Social History     Socioeconomic History    Marital status: Single     Spouse name: Not on file    Number of children: Not on file    Years of education: Not on file    Highest education level: Not on file   Occupational History    Not on file   Social Needs    Financial resource strain: Not on file    Food insecurity     Worry: Not on file     Inability: Not on file   [employer] needs     Medical: Not on file     Non-medical: Not on file   Tobacco Use    Smoking status: Never Smoker    Smokeless tobacco: Never Used   Substance and Sexual Activity    Alcohol use: No    Drug use: No    Sexual activity: Never   Lifestyle    Physical activity     Days per week: Not on file     Minutes per session: Not on file    Stress: Not on file   Relationships    Social connections     Talks on phone: Not on file     Gets together: Not on file     Attends [employer] service: Not on file     Active member of club or organization: Not on file     Attends meetings of clubs or organizations: Not on file     Relationship status: Not on file    Intimate partner violence     Fear of current or ex partner: Not on file     Emotionally abused: Not on file     Physically abused: Not on file     Forced sexual activity: Not on file   Other Topics Concern    Not on file   Social History Narrative    Not on file        Family History   Problem Relation Age of Onset    Diabetes Mother         borderline    High Blood Pressure Mother     Cervical Cancer Maternal Aunt     Diabetes Maternal Grandmother     Prostate Cancer Maternal Grandfather        ADVANCE DIRECTIVE: N, <no information>    Vitals:    10/07/20 1349   BP: 124/75   Site: Left Upper Arm   Position: Sitting   Cuff Size: Large Adult   Pulse: 64   Temp: 97.9 °F (36.6 °C)   SpO2: 98%   Weight: 173 lb 12.8 oz (78.8 kg)   Height: 5' 6\" (1.676 m)     Estimated body mass index is 28.05 kg/m² as calculated from the following:    Height as of this encounter: 5' 6\" (1.676 m). Weight as of this encounter: 173 lb 12.8 oz (78.8 kg). Physical Exam  Constitutional:       General: She is not in acute distress. Appearance: She is well-developed. She is not diaphoretic. HENT:      Head: Normocephalic and atraumatic. Right Ear: External ear normal.      Left Ear: External ear normal.      Nose: Nose normal.   Eyes:      Conjunctiva/sclera: Conjunctivae normal.      Pupils: Pupils are equal, round, and reactive to light. Neck:      Musculoskeletal: Full passive range of motion without pain and normal range of motion. Thyroid: No thyroid mass. Trachea: Trachea normal.   Cardiovascular:      Rate and Rhythm: Normal rate and regular rhythm. Heart sounds: Normal heart sounds, S1 normal and S2 normal. Heart sounds not distant. No friction rub. Pulmonary:      Effort: Pulmonary effort is normal. No accessory muscle usage or respiratory distress. Breath sounds: Normal breath sounds. Abdominal:      General: Bowel sounds are normal. There is no distension. Palpations: Abdomen is soft. There is no mass. Musculoskeletal: Normal range of motion. Comments: Pain free ROM     Lymphadenopathy:      Cervical: No cervical adenopathy. Skin:     General: Skin is warm and dry. Findings: No rash. Neurological:      Mental Status: She is oriented to person, place, and time. Comments: Gait is normal.   Psychiatric:         Behavior: Behavior normal.         Thought Content:  Thought content normal. file.    An electronic signature was used to authenticate this note.     --Thomas Babcock, JOE - CNP on 10/7/2020 at 2:06 PM no

## 2024-02-20 ENCOUNTER — OFFICE VISIT (OUTPATIENT)
Dept: BEHAVIORAL/MENTAL HEALTH CLINIC | Age: 69
End: 2024-02-20

## 2024-02-20 DIAGNOSIS — F43.22 ADJUSTMENT DISORDER WITH ANXIOUS MOOD: Primary | ICD-10-CM

## 2024-02-20 NOTE — PROGRESS NOTES
ADULT BEHAVIORAL HEALTH FOLLOW UP  Tomeka Bruner       Visit Date: 2/20/2024   Time of appointment:  1:04p   Time spent with Patient: 42 minutes.   This is patient's third appointment.    Reason for Consult:  Follow-up     Referring Provider/PCP:    No ref. provider found  Mehdi Pelayo, APRN - CNP      Pt provided informed consent for the behavioral health program. Discussed with patient model of service to include the limits of confidentiality (i.e. abuse reporting, suicide intervention, etc.) and short-term intervention focused approach.  Pt indicated understanding.    KELLIE Parker is a 69 y.o. female who presents for follow up of anxiety and relationship stressors. She reported she has been spending time with her mom. She reported her daughter unblocked her and she has been trying to reach out but her daughter has not responded. She continued processing how she is coming to terms with things out of her control. Therapist provided active listening and validated clt's feelings. Anxious sx were continued to be processed and how she is trying to better manage maladaptive thinking. She reported she is working on putting her needs first and identified she has trips coming up that she is looking forward to. Parenting stressors were processed and how she managed the guilt around having to work and be a single parent when her kids were young Self-care was reviewed. Therapist validated her ability to incorporate self-care and encouraged her to continue to use these tools.    Previous Recommendations: Encouraged to work on boundaries, work on communication skills, and confront maladaptive thinking. She requested to go monthly.             MENTAL STATUS EXAM  Mood was within normal limits with calm affect.   Suicidal ideation was denied.   Homicidal ideation was denied.   Hygiene was fair .  Dress was appropriate.   Behavior was Within Normal Limits with No observation or self-report of difficulties ambulating.

## 2024-03-04 SDOH — HEALTH STABILITY: PHYSICAL HEALTH: ON AVERAGE, HOW MANY DAYS PER WEEK DO YOU ENGAGE IN MODERATE TO STRENUOUS EXERCISE (LIKE A BRISK WALK)?: 3 DAYS

## 2024-03-04 SDOH — HEALTH STABILITY: PHYSICAL HEALTH: ON AVERAGE, HOW MANY MINUTES DO YOU ENGAGE IN EXERCISE AT THIS LEVEL?: 120 MIN

## 2024-03-04 ASSESSMENT — LIFESTYLE VARIABLES
HOW MANY STANDARD DRINKS CONTAINING ALCOHOL DO YOU HAVE ON A TYPICAL DAY: 0
HOW OFTEN DO YOU HAVE A DRINK CONTAINING ALCOHOL: 1
HOW MANY STANDARD DRINKS CONTAINING ALCOHOL DO YOU HAVE ON A TYPICAL DAY: PATIENT DOES NOT DRINK
HOW OFTEN DO YOU HAVE SIX OR MORE DRINKS ON ONE OCCASION: 1
HOW OFTEN DO YOU HAVE A DRINK CONTAINING ALCOHOL: NEVER

## 2024-03-04 ASSESSMENT — PATIENT HEALTH QUESTIONNAIRE - PHQ9
SUM OF ALL RESPONSES TO PHQ QUESTIONS 1-9: 2
SUM OF ALL RESPONSES TO PHQ QUESTIONS 1-9: 2
SUM OF ALL RESPONSES TO PHQ9 QUESTIONS 1 & 2: 2
1. LITTLE INTEREST OR PLEASURE IN DOING THINGS: 0
SUM OF ALL RESPONSES TO PHQ QUESTIONS 1-9: 2
SUM OF ALL RESPONSES TO PHQ QUESTIONS 1-9: 2
2. FEELING DOWN, DEPRESSED OR HOPELESS: 2

## 2024-03-06 ENCOUNTER — HOSPITAL ENCOUNTER (OUTPATIENT)
Age: 69
Discharge: HOME | End: 2024-03-06
Payer: MEDICARE

## 2024-03-06 ENCOUNTER — OFFICE VISIT (OUTPATIENT)
Dept: PRIMARY CARE CLINIC | Age: 69
End: 2024-03-06
Payer: MEDICARE

## 2024-03-06 VITALS
OXYGEN SATURATION: 100 % | HEART RATE: 65 BPM | BODY MASS INDEX: 29.57 KG/M2 | SYSTOLIC BLOOD PRESSURE: 131 MMHG | HEIGHT: 66 IN | WEIGHT: 184 LBS | DIASTOLIC BLOOD PRESSURE: 75 MMHG

## 2024-03-06 DIAGNOSIS — Z78.0 ASYMPTOMATIC MENOPAUSAL STATE: ICD-10-CM

## 2024-03-06 DIAGNOSIS — Z13.29 SCREENING FOR THYROID DISORDER: ICD-10-CM

## 2024-03-06 DIAGNOSIS — R79.89 ELEVATED TSH: ICD-10-CM

## 2024-03-06 DIAGNOSIS — Z00.00 INITIAL MEDICARE ANNUAL WELLNESS VISIT: Primary | ICD-10-CM

## 2024-03-06 DIAGNOSIS — N28.9 KIDNEY FUNCTION ABNORMAL: ICD-10-CM

## 2024-03-06 DIAGNOSIS — R73.9 HYPERGLYCEMIA: ICD-10-CM

## 2024-03-06 DIAGNOSIS — Z71.89 ACP (ADVANCE CARE PLANNING): ICD-10-CM

## 2024-03-06 LAB
ANION GAP SERPL CALCULATED.3IONS-SCNC: 11 MMOL/L (ref 9–16)
BUN SERPL-MCNC: 11 MG/DL (ref 8–23)
CALCIUM SERPL-MCNC: 10.1 MG/DL (ref 8.6–10.4)
CHLORIDE SERPL-SCNC: 106 MMOL/L (ref 98–107)
CO2 SERPL-SCNC: 25 MMOL/L (ref 20–31)
CREAT SERPL-MCNC: 1 MG/DL (ref 0.5–0.9)
EST. AVERAGE GLUCOSE BLD GHB EST-MCNC: 131 MG/DL
GFR SERPL CREATININE-BSD FRML MDRD: 62 ML/MIN/1.73M2
GLUCOSE SERPL-MCNC: 105 MG/DL (ref 74–99)
HBA1C MFR BLD: 6.2 % (ref 4–6)
POTASSIUM SERPL-SCNC: 4.7 MMOL/L (ref 3.7–5.3)
SODIUM SERPL-SCNC: 142 MMOL/L (ref 136–145)

## 2024-03-06 PROCEDURE — 99497 ADVNCD CARE PLAN 30 MIN: CPT | Performed by: NURSE PRACTITIONER

## 2024-03-06 PROCEDURE — 3017F COLORECTAL CA SCREEN DOC REV: CPT | Performed by: NURSE PRACTITIONER

## 2024-03-06 PROCEDURE — 80048 BASIC METABOLIC PNL TOTAL CA: CPT

## 2024-03-06 PROCEDURE — 83036 HEMOGLOBIN GLYCOSYLATED A1C: CPT

## 2024-03-06 PROCEDURE — 1123F ACP DISCUSS/DSCN MKR DOCD: CPT | Performed by: NURSE PRACTITIONER

## 2024-03-06 PROCEDURE — G8484 FLU IMMUNIZE NO ADMIN: HCPCS | Performed by: NURSE PRACTITIONER

## 2024-03-06 PROCEDURE — G0438 PPPS, INITIAL VISIT: HCPCS | Performed by: NURSE PRACTITIONER

## 2024-03-06 PROCEDURE — 36415 COLL VENOUS BLD VENIPUNCTURE: CPT

## 2024-03-06 RX ORDER — CYCLOSPORINE 0.5 MG/ML
1 EMULSION OPHTHALMIC 2 TIMES DAILY
COMMUNITY
Start: 2024-01-05

## 2024-03-06 NOTE — RESULT ENCOUNTER NOTE
Pre diabetes, needs to watch carbohydrates and sugars. Creatinine (kidney function) elevated but stable.  Avoid ibuprofen, make sure she stays hydrated.

## 2024-03-06 NOTE — PROGRESS NOTES
Medicare Annual Wellness Visit    Elena Rodriguez is here for Medicare AWV (Patient is here today for a medicare wellness)    Assessment & Plan   Initial Medicare annual wellness visit  Hyperglycemia  -     Hemoglobin A1C; Future  Kidney function abnormal  -     Basic Metabolic Panel; Future  ACP (advance care planning)  -     [place] Advanced Care Planning (16-30 minutes) [84574]  Asymptomatic menopausal state  -     DEXA Bone Density Axial Skeleton; Future  Elevated TSH  Screening for thyroid disorder  -     TSH; Future    Recommendations for Preventive Services Due: see orders and patient instructions/AVS.  Recommended screening schedule for the next 5-10 years is provided to the patient in written form: see Patient Instructions/AVS.    Pt states she has had a dexa scan.   Pt states she has a poa but she is working on switching it to her son- David Damon III     No follow-ups on file.     Subjective       Patient's complete Health Risk Assessment and screening values have been reviewed and are found in Flowsheets. The following problems were reviewed today and where indicated follow up appointments were made and/or referrals ordered.    Positive Risk Factor Screenings with Interventions:               General HRA Questions:  Select all that apply: (!) Stress    Stress Interventions:  Pt denies that she is having new stress, states her stress levels are normal.                           Objective   Vitals:    03/06/24 0751   BP: 131/75   Site: Left Upper Arm   Position: Sitting   Cuff Size: Large Adult   Pulse: 65   SpO2: 100%   Weight: 83.5 kg (184 lb)   Height: 1.676 m (5' 6\")      Body mass index is 29.7 kg/m².     Wt Readings from Last 3 Encounters:   03/06/24 83.5 kg (184 lb)   08/30/23 80.1 kg (176 lb 9.6 oz)   08/10/22 81.2 kg (179 lb)        No Known Allergies  Prior to Visit Medications    Medication Sig Taking? Authorizing Provider   RESTASIS 0.05 % ophthalmic emulsion Place 1 drop into both eyes in the

## 2024-03-06 NOTE — PATIENT INSTRUCTIONS

## 2024-03-19 ENCOUNTER — OFFICE VISIT (OUTPATIENT)
Dept: BEHAVIORAL/MENTAL HEALTH CLINIC | Age: 69
End: 2024-03-19
Payer: MEDICARE

## 2024-03-19 DIAGNOSIS — F43.22 ADJUSTMENT DISORDER WITH ANXIOUS MOOD: Primary | ICD-10-CM

## 2024-03-19 PROCEDURE — 1123F ACP DISCUSS/DSCN MKR DOCD: CPT | Performed by: COUNSELOR

## 2024-03-19 PROCEDURE — 90832 PSYTX W PT 30 MINUTES: CPT | Performed by: COUNSELOR

## 2024-03-19 NOTE — PROGRESS NOTES
ADULT BEHAVIORAL HEALTH FOLLOW UP  Tomeka Bruner       Visit Date: 3/19/2024   Time of appointment:  10:05a   Time spent with Patient: 30 minutes.   This is patient's fourth appointment.    Reason for Consult:  Follow-up     Referring Provider/PCP:    No ref. provider found  Mehdi Pelayo, APRN - CNP      Pt provided informed consent for the behavioral health program. Discussed with patient model of service to include the limits of confidentiality (i.e. abuse reporting, suicide intervention, etc.) and short-term intervention focused approach.  Pt indicated understanding.    KELLIE Parker is a 69 y.o. female who presents for follow up of anxiety. She reported she enjoyed her trip to [place] with her family. She processed how the past month went. Therapist provided active listening and validated feelings. She reported she has not spoken to daughter but did see her walking around one day and processed how this went when daughter ignored her. She identified she has been working on letting go her sense of control with daughter and is trying to work on reframe her mindset. Therapist validated her progress and insight. Self-care was reviewed. She reported she has been using perspective taking and this has allowed her to set more realistic expectations. She continued processing how she is trying to manage her need for control. Therapist provided psychoeducation on how to continue to better manage control and anxious thinking.     Previous Recommendations: Encouraged her to continue to work on boundaries and coping skills. Encouraged her to continue to work on self-care skills.             MENTAL STATUS EXAM  Mood was within normal limits with calm affect.   Suicidal ideation was denied.   Homicidal ideation was denied.   Hygiene was fair .  Dress was neat.   Behavior was Within Normal Limits with No observation or self-report of difficulties ambulating.   Attitude was Cooperative.  Eye-contact was good.  Speech:

## 2024-03-20 ENCOUNTER — HOSPITAL ENCOUNTER (OUTPATIENT)
Dept: MAMMOGRAPHY | Age: 69
Discharge: HOME | End: 2024-03-22
Payer: MEDICARE

## 2024-03-20 DIAGNOSIS — Z78.0 ASYMPTOMATIC MENOPAUSAL STATE: ICD-10-CM

## 2024-03-20 PROCEDURE — 77080 DXA BONE DENSITY AXIAL: CPT

## 2024-03-22 NOTE — RESULT ENCOUNTER NOTE
The National Osteoporosis Foundation (NOF) recommends 1,200 to 1500 mg of calcium per day and 800 to 1,000 IUs of daily vitamin D for adults for osteopenia- also important to get weight bearing exercise.

## 2024-04-16 ENCOUNTER — OFFICE VISIT (OUTPATIENT)
Dept: BEHAVIORAL/MENTAL HEALTH CLINIC | Age: 69
End: 2024-04-16
Payer: MEDICARE

## 2024-04-16 DIAGNOSIS — F43.22 ADJUSTMENT DISORDER WITH ANXIOUS MOOD: Primary | ICD-10-CM

## 2024-04-16 PROCEDURE — 90832 PSYTX W PT 30 MINUTES: CPT | Performed by: COUNSELOR

## 2024-04-16 PROCEDURE — 1123F ACP DISCUSS/DSCN MKR DOCD: CPT | Performed by: COUNSELOR

## 2024-04-16 NOTE — PROGRESS NOTES
ADULT BEHAVIORAL HEALTH FOLLOW UP  Tomeka Bruner       Visit Date: 4/16/2024   Time of appointment:  10:04a   Time spent with Patient: 34 minutes.   This is patient's fifth appointment.    Reason for Consult:  Follow-up     Referring Provider/PCP:    No ref. provider found  Mehdi Pelayo, APRN - CNP      Pt provided informed consent for the behavioral health program. Discussed with patient model of service to include the limits of confidentiality (i.e. abuse reporting, suicide intervention, etc.) and short-term intervention focused approach.  Pt indicated understanding.    KELLIE Parker is a 69 y.o. female who presents for follow up of anxiety. She reported she has been busy today and has been trying to go to the gym 2x/wk. She reported she is interested in retiring completely within the month. Therapist provided active listening and validated feelings. She reported she is wanting to travel more and enjoy her alf. She has noticed the last year she has had a harder time motivating herself to go to work. She reported her support sx is still helpful and she is still attending her [employer] women meetings and enjoys this. She reported she has not heard from daughter but family keeps her in the loop. She reported she has gotten better with reducing her urge to drive around looking for daughter. She reported her routines are still helping her. She reported she is feeling better overall.     Previous Recommendations: Encouraged to continue to use coping skills as well as self-care skills.             MENTAL STATUS EXAM  Mood was within normal limits with calm affect.   Suicidal ideation was denied.   Homicidal ideation was denied.   Hygiene was fair .  Dress was appropriate.   Behavior was Within Normal Limits with No observation or self-report of difficulties ambulating.   Attitude was Cooperative.  Eye-contact was fair.  Speech: rate - WNL, rhythm - WNL, volume - WNL.  Verbalizations were goal

## 2024-05-14 ENCOUNTER — OFFICE VISIT (OUTPATIENT)
Dept: BEHAVIORAL/MENTAL HEALTH CLINIC | Age: 69
End: 2024-05-14
Payer: MEDICARE

## 2024-05-14 DIAGNOSIS — F43.22 ADJUSTMENT DISORDER WITH ANXIOUS MOOD: Primary | ICD-10-CM

## 2024-05-14 PROCEDURE — 1123F ACP DISCUSS/DSCN MKR DOCD: CPT | Performed by: COUNSELOR

## 2024-05-14 PROCEDURE — 90832 PSYTX W PT 30 MINUTES: CPT | Performed by: COUNSELOR

## 2024-05-14 NOTE — PROGRESS NOTES
ADULT BEHAVIORAL HEALTH FOLLOW UP  Tomeka Bruner       Visit Date: 5/14/2024   Time of appointment:  10:04a   Time spent with Patient: 30 minutes.   This is patient's sixth appointment.    Reason for Consult:  Follow-up     Referring Provider/PCP:    No ref. provider found  Mehdi Pelayo, APRN - CNP      Pt provided informed consent for the behavioral health program. Discussed with patient model of service to include the limits of confidentiality (i.e. abuse reporting, suicide intervention, etc.) and short-term intervention focused approach.  Pt indicated understanding.    KELLIE Parker is a 69 y.o. female who presents for follow up of anxiety and relationship stressors. She processed how the past month has been. She reported she saw her daughter but did not approach her this past weekend. She reported she does not care much about Mother's Day and began processing her thinking. Therapist provided active listening and validated feelings. She acknowledged she has been trying to use perspective taking in relation to various relationship stressors. She reviewed the coping skills she has utilized to better manage anxious sx. She reported depression and anxiety are manageable. She reported she is wanting to take a break from therapy due to being in a better head space and acknowledged if she needs to get back in she will call. Therapist validated insight and progress. Therapist worked with her on weighing pros/cons on her options on how to handle relationship stressors. Therapist provided psychoeducation on how to work on anxious thinking and use healthier communication skills. She reported this was helpful and could use tools identified today. She asked for hug at end of session.     Previous Recommendations: Encouraged to continue to use her support sx, coping, and self-care skills. She reported she is unsure if she wants to continue therapy and is open to discussing at follow up session. She reported this

## 2024-09-04 ENCOUNTER — OFFICE VISIT (OUTPATIENT)
Dept: PRIMARY CARE CLINIC | Age: 69
End: 2024-09-04
Payer: MEDICARE

## 2024-09-04 VITALS
HEART RATE: 68 BPM | BODY MASS INDEX: 28.96 KG/M2 | DIASTOLIC BLOOD PRESSURE: 68 MMHG | OXYGEN SATURATION: 98 % | WEIGHT: 180.2 LBS | SYSTOLIC BLOOD PRESSURE: 120 MMHG | HEIGHT: 66 IN | RESPIRATION RATE: 16 BRPM

## 2024-09-04 DIAGNOSIS — R31.9 HEMATURIA, UNSPECIFIED TYPE: Primary | ICD-10-CM

## 2024-09-04 DIAGNOSIS — R82.998 LEUKOCYTES IN URINE: ICD-10-CM

## 2024-09-04 LAB
BILIRUBIN, POC: NORMAL
BLOOD URINE, POC: NORMAL
CLARITY, POC: NORMAL
COLOR, POC: YELLOW
GLUCOSE URINE, POC: NORMAL MG/DL
KETONES, POC: 5 MG/DL
LEUKOCYTE EST, POC: 15
NITRITE, POC: NORMAL
PH, POC: 6
PROTEIN, POC: NORMAL MG/DL
SPECIFIC GRAVITY, POC: 1.02
UROBILINOGEN, POC: 0.2 MG/DL

## 2024-09-04 PROCEDURE — 3017F COLORECTAL CA SCREEN DOC REV: CPT | Performed by: NURSE PRACTITIONER

## 2024-09-04 PROCEDURE — 1090F PRES/ABSN URINE INCON ASSESS: CPT | Performed by: NURSE PRACTITIONER

## 2024-09-04 PROCEDURE — 99214 OFFICE O/P EST MOD 30 MIN: CPT | Performed by: NURSE PRACTITIONER

## 2024-09-04 PROCEDURE — 1036F TOBACCO NON-USER: CPT | Performed by: NURSE PRACTITIONER

## 2024-09-04 PROCEDURE — 81003 URINALYSIS AUTO W/O SCOPE: CPT | Performed by: NURSE PRACTITIONER

## 2024-09-04 PROCEDURE — 1123F ACP DISCUSS/DSCN MKR DOCD: CPT | Performed by: NURSE PRACTITIONER

## 2024-09-04 PROCEDURE — G8399 PT W/DXA RESULTS DOCUMENT: HCPCS | Performed by: NURSE PRACTITIONER

## 2024-09-04 PROCEDURE — G8427 DOCREV CUR MEDS BY ELIG CLIN: HCPCS | Performed by: NURSE PRACTITIONER

## 2024-09-04 PROCEDURE — G8419 CALC BMI OUT NRM PARAM NOF/U: HCPCS | Performed by: NURSE PRACTITIONER

## 2024-09-04 RX ORDER — NITROFURANTOIN 25; 75 MG/1; MG/1
100 CAPSULE ORAL 2 TIMES DAILY
Qty: 14 CAPSULE | Refills: 0 | Status: SHIPPED | OUTPATIENT
Start: 2024-09-04 | End: 2024-09-11

## 2024-09-04 SDOH — ECONOMIC STABILITY: FOOD INSECURITY: WITHIN THE PAST 12 MONTHS, YOU WORRIED THAT YOUR FOOD WOULD RUN OUT BEFORE YOU GOT MONEY TO BUY MORE.: NEVER TRUE

## 2024-09-04 SDOH — ECONOMIC STABILITY: FOOD INSECURITY: WITHIN THE PAST 12 MONTHS, THE FOOD YOU BOUGHT JUST DIDN'T LAST AND YOU DIDN'T HAVE MONEY TO GET MORE.: NEVER TRUE

## 2024-09-04 SDOH — ECONOMIC STABILITY: INCOME INSECURITY: HOW HARD IS IT FOR YOU TO PAY FOR THE VERY BASICS LIKE FOOD, HOUSING, MEDICAL CARE, AND HEATING?: NOT HARD AT ALL

## 2024-09-04 ASSESSMENT — PATIENT HEALTH QUESTIONNAIRE - PHQ9
1. LITTLE INTEREST OR PLEASURE IN DOING THINGS: NOT AT ALL
SUM OF ALL RESPONSES TO PHQ9 QUESTIONS 1 & 2: 0
SUM OF ALL RESPONSES TO PHQ QUESTIONS 1-9: 0
2. FEELING DOWN, DEPRESSED OR HOPELESS: NOT AT ALL
SUM OF ALL RESPONSES TO PHQ QUESTIONS 1-9: 0

## 2024-09-04 ASSESSMENT — ENCOUNTER SYMPTOMS
COUGH: 0
SHORTNESS OF BREATH: 0
CRAMPS: 1

## 2024-09-04 NOTE — PROGRESS NOTES
Elena Rodriguez (:  1955) is a 69 y.o. female,Established patient, here for evaluation of the following chief complaint(s):  Abdominal Cramping (Pressure right low abdominal. Urine Cloudy, dark & blood after wiped blood.)      Assessment & Plan   ASSESSMENT/PLAN:  1. Hematuria, unspecified type  -     POCT Urinalysis No Micro (Auto)  2. Leukocytes in urine  -     nitrofurantoin, macrocrystal-monohydrate, (MACROBID) 100 MG capsule; Take 1 capsule by mouth 2 times daily for 7 days, Disp-14 capsule, R-0Normal      No follow-ups on file.         Subjective   SUBJECTIVE/OBJECTIVE:  Abdominal Cramping  Associated symptoms include hematuria. Pertinent negatives include no dysuria, fever or frequency.     Rlq pressure for a few months.  Feels like an ache if the pressure gets worse.  Urine has been cloudy off and on. Four to five weeks ago she had some blood in the toilet when she urinated.  Three weeks ago when she urinated and wiped there was blood.  Denies vaginal bleeding.     Review of Systems   Constitutional:  Negative for chills and fever.   Respiratory:  Negative for cough and shortness of breath.    Cardiovascular:  Negative for chest pain, palpitations and leg swelling.   Genitourinary:  Positive for hematuria and urgency. Negative for difficulty urinating, dysuria, flank pain, frequency, vaginal bleeding and vaginal discharge.     Objective   Vitals:    24 0956   BP: 120/68   Pulse: 68   Resp: 16   SpO2: 98%     Physical Exam  Constitutional:       Appearance: She is well-developed.   HENT:      Right Ear: External ear normal.      Left Ear: External ear normal.      Nose: Nose normal.   Cardiovascular:      Rate and Rhythm: Normal rate and regular rhythm.      Heart sounds: Normal heart sounds, S1 normal and S2 normal.   Pulmonary:      Effort: Pulmonary effort is normal. No respiratory distress.      Breath sounds: Normal breath sounds.   Musculoskeletal:         General: No deformity. Normal  Purse String (Simple) Text: Given the location of the defect and the characteristics of the surrounding skin a purse string simple closure was deemed most appropriate.  Undermining was performed circumferentially around the surgical defect.  A purse string suture was then placed and tightened.

## 2024-09-25 ENCOUNTER — OFFICE VISIT (OUTPATIENT)
Dept: PRIMARY CARE CLINIC | Age: 69
End: 2024-09-25
Payer: MEDICARE

## 2024-09-25 VITALS
HEART RATE: 53 BPM | BODY MASS INDEX: 28.87 KG/M2 | SYSTOLIC BLOOD PRESSURE: 136 MMHG | DIASTOLIC BLOOD PRESSURE: 80 MMHG | TEMPERATURE: 97.2 F | HEIGHT: 66 IN | OXYGEN SATURATION: 99 % | WEIGHT: 179.6 LBS

## 2024-09-25 DIAGNOSIS — G89.29 CHRONIC RLQ PAIN: Primary | ICD-10-CM

## 2024-09-25 DIAGNOSIS — R10.31 CHRONIC RLQ PAIN: Primary | ICD-10-CM

## 2024-09-25 PROCEDURE — G8399 PT W/DXA RESULTS DOCUMENT: HCPCS | Performed by: NURSE PRACTITIONER

## 2024-09-25 PROCEDURE — 99213 OFFICE O/P EST LOW 20 MIN: CPT | Performed by: NURSE PRACTITIONER

## 2024-09-25 PROCEDURE — G8427 DOCREV CUR MEDS BY ELIG CLIN: HCPCS | Performed by: NURSE PRACTITIONER

## 2024-09-25 PROCEDURE — 1123F ACP DISCUSS/DSCN MKR DOCD: CPT | Performed by: NURSE PRACTITIONER

## 2024-09-25 PROCEDURE — 3017F COLORECTAL CA SCREEN DOC REV: CPT | Performed by: NURSE PRACTITIONER

## 2024-09-25 PROCEDURE — 1036F TOBACCO NON-USER: CPT | Performed by: NURSE PRACTITIONER

## 2024-09-25 PROCEDURE — G8419 CALC BMI OUT NRM PARAM NOF/U: HCPCS | Performed by: NURSE PRACTITIONER

## 2024-09-25 PROCEDURE — 1090F PRES/ABSN URINE INCON ASSESS: CPT | Performed by: NURSE PRACTITIONER

## 2024-09-25 ASSESSMENT — ENCOUNTER SYMPTOMS
CONSTIPATION: 0
ABDOMINAL PAIN: 1
BLOOD IN STOOL: 0
SHORTNESS OF BREATH: 0
COUGH: 0
DIARRHEA: 0

## 2024-10-04 ENCOUNTER — HOSPITAL ENCOUNTER (OUTPATIENT)
Dept: CT IMAGING | Age: 69
Discharge: HOME | End: 2024-10-06
Payer: MEDICARE

## 2024-10-04 DIAGNOSIS — R10.31 CHRONIC RLQ PAIN: ICD-10-CM

## 2024-10-04 DIAGNOSIS — G89.29 CHRONIC RLQ PAIN: ICD-10-CM

## 2024-10-04 PROCEDURE — 2500000003 HC RX 250 WO HCPCS: Performed by: INTERNAL MEDICINE

## 2024-10-04 PROCEDURE — 74176 CT ABD & PELVIS W/O CONTRAST: CPT

## 2024-10-04 RX ADMIN — BARIUM SULFATE 450 ML: 20 SUSPENSION ORAL at 12:46

## 2024-10-08 NOTE — RESULT ENCOUNTER NOTE
She has inguinal hernias on both sides, this could explain her pain.  She also has a calcified lesion on her liver- I don't have any imaging to compare it to, we can discuss rechecking the future at her next visit.  If she would like to see a surgeon regarding the hernias please let me know.

## 2024-12-04 ENCOUNTER — OFFICE VISIT (OUTPATIENT)
Dept: PRIMARY CARE CLINIC | Age: 69
End: 2024-12-04
Payer: MEDICARE

## 2024-12-04 VITALS
BODY MASS INDEX: 29.09 KG/M2 | OXYGEN SATURATION: 98 % | SYSTOLIC BLOOD PRESSURE: 132 MMHG | HEIGHT: 66 IN | DIASTOLIC BLOOD PRESSURE: 85 MMHG | TEMPERATURE: 97.7 F | HEART RATE: 62 BPM | WEIGHT: 181 LBS

## 2024-12-04 DIAGNOSIS — K40.20 BILATERAL INGUINAL HERNIA WITHOUT OBSTRUCTION OR GANGRENE, RECURRENCE NOT SPECIFIED: Primary | ICD-10-CM

## 2024-12-04 PROCEDURE — 1090F PRES/ABSN URINE INCON ASSESS: CPT | Performed by: NURSE PRACTITIONER

## 2024-12-04 PROCEDURE — G8427 DOCREV CUR MEDS BY ELIG CLIN: HCPCS | Performed by: NURSE PRACTITIONER

## 2024-12-04 PROCEDURE — 3017F COLORECTAL CA SCREEN DOC REV: CPT | Performed by: NURSE PRACTITIONER

## 2024-12-04 PROCEDURE — 1036F TOBACCO NON-USER: CPT | Performed by: NURSE PRACTITIONER

## 2024-12-04 PROCEDURE — 1123F ACP DISCUSS/DSCN MKR DOCD: CPT | Performed by: NURSE PRACTITIONER

## 2024-12-04 PROCEDURE — 1159F MED LIST DOCD IN RCRD: CPT | Performed by: NURSE PRACTITIONER

## 2024-12-04 PROCEDURE — G8484 FLU IMMUNIZE NO ADMIN: HCPCS | Performed by: NURSE PRACTITIONER

## 2024-12-04 PROCEDURE — 99213 OFFICE O/P EST LOW 20 MIN: CPT | Performed by: NURSE PRACTITIONER

## 2024-12-04 PROCEDURE — G8419 CALC BMI OUT NRM PARAM NOF/U: HCPCS | Performed by: NURSE PRACTITIONER

## 2024-12-04 PROCEDURE — G8399 PT W/DXA RESULTS DOCUMENT: HCPCS | Performed by: NURSE PRACTITIONER

## 2024-12-04 ASSESSMENT — ENCOUNTER SYMPTOMS
COUGH: 0
SHORTNESS OF BREATH: 0

## 2024-12-04 NOTE — PROGRESS NOTES
Elena Rodriguez (:  1955) is a 69 y.o. female,Established patient, here for evaluation of the following chief complaint(s):  Follow-up (Ct scan)         Assessment & Plan  Bilateral inguinal hernia without obstruction or gangrene, recurrence not specified   Chronic, at goal (stable), continue current treatment plan           Return in about 3 months (around 3/4/2025).       Subjective   HPI  Pain has improved.  Comes and goes. Not sure that she wants to see a surgeon. Will let me know.     Is going to look into finding a new therapist.     Review of Systems   Constitutional:  Negative for chills and fever.   Respiratory:  Negative for cough and shortness of breath.    Cardiovascular:  Negative for chest pain, palpitations and leg swelling.     Objective   Vitals:    24 0926   BP: 132/85   Pulse: 62   Temp: 97.7 °F (36.5 °C)   SpO2: 98%     Wt Readings from Last 3 Encounters:   24 82.1 kg (181 lb)   24 81.5 kg (179 lb 9.6 oz)   24 81.7 kg (180 lb 3.2 oz)       Physical Exam  Constitutional:       Appearance: She is well-developed.   HENT:      Right Ear: External ear normal.      Left Ear: External ear normal.      Nose: Nose normal.   Cardiovascular:      Rate and Rhythm: Normal rate and regular rhythm.      Heart sounds: Normal heart sounds, S1 normal and S2 normal.   Pulmonary:      Effort: Pulmonary effort is normal. No respiratory distress.      Breath sounds: Normal breath sounds.   Musculoskeletal:         General: No deformity. Normal range of motion.      Cervical back: Full passive range of motion without pain and normal range of motion.   Skin:     General: Skin is warm and dry.   Neurological:      Mental Status: She is alert and oriented to person, place, and time.       An electronic signature was used to authenticate this note.    --JOE HEALY - CNP

## 2025-03-02 SDOH — ECONOMIC STABILITY: FOOD INSECURITY: WITHIN THE PAST 12 MONTHS, THE FOOD YOU BOUGHT JUST DIDN'T LAST AND YOU DIDN'T HAVE MONEY TO GET MORE.: NEVER TRUE

## 2025-03-02 SDOH — ECONOMIC STABILITY: INCOME INSECURITY: IN THE LAST 12 MONTHS, WAS THERE A TIME WHEN YOU WERE NOT ABLE TO PAY THE MORTGAGE OR RENT ON TIME?: NO

## 2025-03-02 SDOH — ECONOMIC STABILITY: TRANSPORTATION INSECURITY
IN THE PAST 12 MONTHS, HAS LACK OF TRANSPORTATION KEPT YOU FROM MEETINGS, WORK, OR FROM GETTING THINGS NEEDED FOR DAILY LIVING?: NO

## 2025-03-02 SDOH — ECONOMIC STABILITY: FOOD INSECURITY: WITHIN THE PAST 12 MONTHS, YOU WORRIED THAT YOUR FOOD WOULD RUN OUT BEFORE YOU GOT MONEY TO BUY MORE.: NEVER TRUE

## 2025-03-02 SDOH — ECONOMIC STABILITY: TRANSPORTATION INSECURITY
IN THE PAST 12 MONTHS, HAS THE LACK OF TRANSPORTATION KEPT YOU FROM MEDICAL APPOINTMENTS OR FROM GETTING MEDICATIONS?: NO

## 2025-03-02 ASSESSMENT — PATIENT HEALTH QUESTIONNAIRE - PHQ9
SUM OF ALL RESPONSES TO PHQ QUESTIONS 1-9: 0
2. FEELING DOWN, DEPRESSED OR HOPELESS: NOT AT ALL
2. FEELING DOWN, DEPRESSED OR HOPELESS: NOT AT ALL
SUM OF ALL RESPONSES TO PHQ QUESTIONS 1-9: 0
1. LITTLE INTEREST OR PLEASURE IN DOING THINGS: NOT AT ALL
SUM OF ALL RESPONSES TO PHQ9 QUESTIONS 1 & 2: 0
1. LITTLE INTEREST OR PLEASURE IN DOING THINGS: NOT AT ALL
SUM OF ALL RESPONSES TO PHQ QUESTIONS 1-9: 0
SUM OF ALL RESPONSES TO PHQ QUESTIONS 1-9: 0

## 2025-03-05 ENCOUNTER — OFFICE VISIT (OUTPATIENT)
Dept: PRIMARY CARE CLINIC | Age: 70
End: 2025-03-05
Payer: MEDICARE

## 2025-03-05 VITALS
OXYGEN SATURATION: 100 % | WEIGHT: 185 LBS | TEMPERATURE: 97.3 F | HEART RATE: 59 BPM | SYSTOLIC BLOOD PRESSURE: 119 MMHG | HEIGHT: 66 IN | DIASTOLIC BLOOD PRESSURE: 85 MMHG | BODY MASS INDEX: 29.73 KG/M2

## 2025-03-05 DIAGNOSIS — R73.9 HYPERGLYCEMIA: ICD-10-CM

## 2025-03-05 DIAGNOSIS — Z12.31 ENCOUNTER FOR SCREENING MAMMOGRAM FOR BREAST CANCER: ICD-10-CM

## 2025-03-05 DIAGNOSIS — Z13.29 SCREENING FOR THYROID DISORDER: ICD-10-CM

## 2025-03-05 DIAGNOSIS — Z79.899 MEDICATION MANAGEMENT: Primary | ICD-10-CM

## 2025-03-05 DIAGNOSIS — Z13.220 SCREENING, LIPID: ICD-10-CM

## 2025-03-05 PROCEDURE — 3017F COLORECTAL CA SCREEN DOC REV: CPT | Performed by: NURSE PRACTITIONER

## 2025-03-05 PROCEDURE — 1036F TOBACCO NON-USER: CPT | Performed by: NURSE PRACTITIONER

## 2025-03-05 PROCEDURE — 1159F MED LIST DOCD IN RCRD: CPT | Performed by: NURSE PRACTITIONER

## 2025-03-05 PROCEDURE — 1123F ACP DISCUSS/DSCN MKR DOCD: CPT | Performed by: NURSE PRACTITIONER

## 2025-03-05 PROCEDURE — 99214 OFFICE O/P EST MOD 30 MIN: CPT | Performed by: NURSE PRACTITIONER

## 2025-03-05 PROCEDURE — 1090F PRES/ABSN URINE INCON ASSESS: CPT | Performed by: NURSE PRACTITIONER

## 2025-03-05 PROCEDURE — G8419 CALC BMI OUT NRM PARAM NOF/U: HCPCS | Performed by: NURSE PRACTITIONER

## 2025-03-05 PROCEDURE — G8427 DOCREV CUR MEDS BY ELIG CLIN: HCPCS | Performed by: NURSE PRACTITIONER

## 2025-03-05 PROCEDURE — G8399 PT W/DXA RESULTS DOCUMENT: HCPCS | Performed by: NURSE PRACTITIONER

## 2025-03-05 ASSESSMENT — ENCOUNTER SYMPTOMS
COUGH: 0
SHORTNESS OF BREATH: 0

## 2025-03-05 NOTE — PROGRESS NOTES
COLONOSCOPY N/A 5/18/2019    COLONOSCOPY SCREENING performed by Anson Hummel MD at [facility] OR    DILATION AND CURETTAGE OF UTERUS  10-    postmenopausal bleeding    ENDOMETRIAL BIOPSY  02/22/16    negative atypia or malignancy    FOOT SURGERY      bilateral    FOOT SURGERY  10 years ago    TUBAL LIGATION       Family History   Problem Relation Age of Onset    Diabetes Mother         borderline    High Blood Pressure Mother     High Cholesterol Mother     Cervical Cancer Maternal Aunt     Cancer Maternal Aunt     Diabetes Maternal Grandmother     Anemia Maternal Grandmother     Arthritis Maternal Grandmother     Prostate Cancer Maternal Grandfather     Diabetes Maternal Grandfather     Early Death Maternal Cousin         Mireille Wilkins    High Cholesterol Maternal Cousin     Immune Disorder Maternal Cousin     High Blood Pressure Sister     Substance Abuse Sister         Alcohol    Stroke Maternal Uncle     Substance Abuse Brother         Alcohol     Social History     Tobacco Use    Smoking status: Never    Smokeless tobacco: Never   Substance Use Topics    Alcohol use: Never      Current Outpatient Medications   Medication Sig Dispense Refill    RESTASIS 0.05 % ophthalmic emulsion Place 1 drop into both eyes in the morning and at bedtime      Multiple Vitamins-Minerals (ZINC PO) Take by mouth      MAGNESIUM PO Take by mouth      FISH OIL by Does not apply route.      vitamin B-12 (CYANOCOBALAMIN) 100 MCG tablet Take 0.5 tablets by mouth daily      Multiple Vitamin (MULTIVITAMINS PO) Take  by mouth.       No current facility-administered medications for this visit.       No Known Allergies    Health Maintenance   Topic Date Due    Shingles vaccine (1 of 2) Never done    Pneumococcal 50+ years Vaccine (1 of 1 - PCV) Never done    Flu vaccine (1) Never done    COVID-19 Vaccine (4 - 2024-25 season) 09/01/2024    Breast cancer screen  12/08/2024    A1C test (Diabetic or Prediabetic)  03/06/2025    Annual

## 2025-03-19 ENCOUNTER — HOSPITAL ENCOUNTER (OUTPATIENT)
Dept: MAMMOGRAPHY | Age: 70
Discharge: HOME | End: 2025-03-21
Payer: MEDICARE

## 2025-03-19 ENCOUNTER — HOSPITAL ENCOUNTER (OUTPATIENT)
Age: 70
Discharge: HOME | End: 2025-03-19
Payer: MEDICARE

## 2025-03-19 VITALS — BODY MASS INDEX: 29.73 KG/M2 | HEIGHT: 66 IN | WEIGHT: 185 LBS

## 2025-03-19 DIAGNOSIS — Z13.220 SCREENING, LIPID: ICD-10-CM

## 2025-03-19 DIAGNOSIS — Z12.31 ENCOUNTER FOR SCREENING MAMMOGRAM FOR BREAST CANCER: ICD-10-CM

## 2025-03-19 DIAGNOSIS — R73.9 HYPERGLYCEMIA: ICD-10-CM

## 2025-03-19 DIAGNOSIS — Z79.899 MEDICATION MANAGEMENT: ICD-10-CM

## 2025-03-19 DIAGNOSIS — Z13.29 SCREENING FOR THYROID DISORDER: ICD-10-CM

## 2025-03-19 LAB
ALBUMIN SERPL-MCNC: 4.3 G/DL (ref 3.5–5.2)
ALBUMIN/GLOB SERPL: 1.6 {RATIO} (ref 1–2.5)
ALP SERPL-CCNC: 73 U/L (ref 35–104)
ALT SERPL-CCNC: 28 U/L (ref 10–35)
ANION GAP SERPL CALCULATED.3IONS-SCNC: 10 MMOL/L (ref 9–16)
AST SERPL-CCNC: 33 U/L (ref 10–35)
BILIRUB SERPL-MCNC: 0.5 MG/DL (ref 0–1.2)
BUN SERPL-MCNC: 13 MG/DL (ref 8–23)
CALCIUM SERPL-MCNC: 10 MG/DL (ref 8.6–10.4)
CHLORIDE SERPL-SCNC: 107 MMOL/L (ref 98–107)
CHOLEST SERPL-MCNC: 233 MG/DL (ref 0–199)
CHOLESTEROL/HDL RATIO: 4.3
CO2 SERPL-SCNC: 24 MMOL/L (ref 20–31)
CREAT SERPL-MCNC: 1.1 MG/DL (ref 0.6–0.9)
ERYTHROCYTE [DISTWIDTH] IN BLOOD BY AUTOMATED COUNT: 13.4 % (ref 11.8–14.4)
EST. AVERAGE GLUCOSE BLD GHB EST-MCNC: 128 MG/DL
GFR, ESTIMATED: 55 ML/MIN/1.73M2
GLUCOSE SERPL-MCNC: 98 MG/DL (ref 74–99)
HBA1C MFR BLD: 6.1 % (ref 4–6)
HCT VFR BLD AUTO: 42.4 % (ref 36.3–47.1)
HDLC SERPL-MCNC: 54 MG/DL
HGB BLD-MCNC: 13.1 G/DL (ref 11.9–15.1)
LDLC SERPL CALC-MCNC: 157 MG/DL (ref 0–100)
MCH RBC QN AUTO: 26.4 PG (ref 25.2–33.5)
MCHC RBC AUTO-ENTMCNC: 30.9 G/DL (ref 28.4–34.8)
MCV RBC AUTO: 85.3 FL (ref 82.6–102.9)
NRBC BLD-RTO: 0 PER 100 WBC
PLATELET # BLD AUTO: 272 K/UL (ref 138–453)
PMV BLD AUTO: 10.7 FL (ref 8.1–13.5)
POTASSIUM SERPL-SCNC: 4.4 MMOL/L (ref 3.7–5.3)
PROT SERPL-MCNC: 7 G/DL (ref 6.6–8.7)
RBC # BLD AUTO: 4.97 M/UL (ref 3.95–5.11)
SODIUM SERPL-SCNC: 141 MMOL/L (ref 136–145)
TRIGL SERPL-MCNC: 112 MG/DL
TSH SERPL DL<=0.05 MIU/L-ACNC: 2.42 UIU/ML (ref 0.27–4.2)
VLDLC SERPL CALC-MCNC: 22 MG/DL (ref 1–30)
WBC OTHER # BLD: 7.1 K/UL (ref 3.5–11.3)

## 2025-03-19 PROCEDURE — 77063 BREAST TOMOSYNTHESIS BI: CPT

## 2025-03-19 PROCEDURE — 80061 LIPID PANEL: CPT

## 2025-03-19 PROCEDURE — 85027 COMPLETE CBC AUTOMATED: CPT

## 2025-03-19 PROCEDURE — 80053 COMPREHEN METABOLIC PANEL: CPT

## 2025-03-19 PROCEDURE — 36415 COLL VENOUS BLD VENIPUNCTURE: CPT

## 2025-03-19 PROCEDURE — 83036 HEMOGLOBIN GLYCOSYLATED A1C: CPT

## 2025-03-19 PROCEDURE — 84443 ASSAY THYROID STIM HORMONE: CPT

## 2025-03-24 ENCOUNTER — RESULTS FOLLOW-UP (OUTPATIENT)
Dept: MAMMOGRAPHY | Age: 70
End: 2025-03-24

## 2025-03-24 ENCOUNTER — RESULTS FOLLOW-UP (OUTPATIENT)
Dept: PRIMARY CARE CLINIC | Age: 70
End: 2025-03-24

## 2025-03-24 NOTE — RESULT ENCOUNTER NOTE
Kidney function remains elevated, we can get her a referral to see nephrology if she would like.  She needs to also avoid nsaids and rink plenty of water.  Other labs ok. Cholesterol levels remain elevated. Statin is recommended but I think in the past she hasn't wanted to do this.

## 2025-03-27 DIAGNOSIS — R79.89 ELEVATED SERUM CREATININE: Primary | ICD-10-CM

## 2025-03-27 DIAGNOSIS — E78.2 MIXED HYPERLIPIDEMIA: ICD-10-CM

## 2025-03-27 RX ORDER — SIMVASTATIN 10 MG
10 TABLET ORAL NIGHTLY
Qty: 90 TABLET | Refills: 1 | Status: SHIPPED | OUTPATIENT
Start: 2025-03-27

## 2025-04-16 DIAGNOSIS — E78.2 MIXED HYPERLIPIDEMIA: Primary | ICD-10-CM

## 2025-04-16 DIAGNOSIS — R73.03 PRE-DIABETES: ICD-10-CM

## 2025-05-09 ENCOUNTER — HOSPITAL ENCOUNTER (OUTPATIENT)
Age: 70
Discharge: HOME | End: 2025-05-09
Payer: MEDICARE

## 2025-05-09 LAB
CHOLEST SERPL-MCNC: 157 MG/DL (ref 0–199)
CHOLESTEROL/HDL RATIO: 2.7
HDLC SERPL-MCNC: 59 MG/DL
LDLC SERPL CALC-MCNC: 83 MG/DL (ref 0–100)
TRIGL SERPL-MCNC: 74 MG/DL
VLDLC SERPL CALC-MCNC: 15 MG/DL (ref 1–30)

## 2025-05-09 PROCEDURE — 80061 LIPID PANEL: CPT

## 2025-05-09 PROCEDURE — 36415 COLL VENOUS BLD VENIPUNCTURE: CPT

## 2025-05-12 ENCOUNTER — RESULTS FOLLOW-UP (OUTPATIENT)
Dept: FAMILY MEDICINE CLINIC | Age: 70
End: 2025-05-12

## 2025-06-18 RX ORDER — SIMVASTATIN 10 MG
10 TABLET ORAL NIGHTLY
Qty: 90 TABLET | Refills: 1 | OUTPATIENT
Start: 2025-06-18

## 2025-08-21 ENCOUNTER — TELEMEDICINE (OUTPATIENT)
Dept: FAMILY MEDICINE CLINIC | Age: 70
End: 2025-08-21
Payer: MEDICARE

## 2025-08-21 DIAGNOSIS — Z00.00 MEDICARE ANNUAL WELLNESS VISIT, SUBSEQUENT: Primary | ICD-10-CM

## 2025-08-21 PROCEDURE — 1159F MED LIST DOCD IN RCRD: CPT | Performed by: NURSE PRACTITIONER

## 2025-08-21 PROCEDURE — G0439 PPPS, SUBSEQ VISIT: HCPCS | Performed by: NURSE PRACTITIONER

## 2025-08-21 PROCEDURE — 1123F ACP DISCUSS/DSCN MKR DOCD: CPT | Performed by: NURSE PRACTITIONER

## 2025-08-21 PROCEDURE — 3017F COLORECTAL CA SCREEN DOC REV: CPT | Performed by: NURSE PRACTITIONER

## 2025-08-21 RX ORDER — SIMVASTATIN 10 MG
10 TABLET ORAL NIGHTLY
Qty: 90 TABLET | Refills: 1 | Status: SHIPPED | OUTPATIENT
Start: 2025-08-21

## 2025-08-21 ASSESSMENT — LIFESTYLE VARIABLES
HOW OFTEN DO YOU HAVE A DRINK CONTAINING ALCOHOL: NEVER
HOW MANY STANDARD DRINKS CONTAINING ALCOHOL DO YOU HAVE ON A TYPICAL DAY: PATIENT DOES NOT DRINK

## 2025-08-21 ASSESSMENT — PATIENT HEALTH QUESTIONNAIRE - PHQ9
1. LITTLE INTEREST OR PLEASURE IN DOING THINGS: NOT AT ALL
SUM OF ALL RESPONSES TO PHQ QUESTIONS 1-9: 0
SUM OF ALL RESPONSES TO PHQ QUESTIONS 1-9: 0
2. FEELING DOWN, DEPRESSED OR HOPELESS: NOT AT ALL
SUM OF ALL RESPONSES TO PHQ QUESTIONS 1-9: 0
SUM OF ALL RESPONSES TO PHQ QUESTIONS 1-9: 0

## 2025-08-22 ENCOUNTER — HOSPITAL ENCOUNTER (OUTPATIENT)
Dept: ULTRASOUND IMAGING | Age: 70
Discharge: HOME | End: 2025-08-24
Attending: INTERNAL MEDICINE
Payer: MEDICARE

## 2025-08-22 ENCOUNTER — HOSPITAL ENCOUNTER (OUTPATIENT)
Dept: LAB | Age: 70
Discharge: HOME | End: 2025-08-22
Payer: MEDICARE

## 2025-08-22 DIAGNOSIS — R53.82 CHRONIC FATIGUE, UNSPECIFIED: ICD-10-CM

## 2025-08-22 DIAGNOSIS — N18.31 STAGE 3A CHRONIC KIDNEY DISEASE (HCC): ICD-10-CM

## 2025-08-22 LAB
25(OH)D3 SERPL-MCNC: 40.4 NG/ML (ref 30–100)
ALBUMIN SERPL-MCNC: 4.1 G/DL (ref 3.5–5.2)
ALBUMIN/GLOB SERPL: 1.5 {RATIO} (ref 1–2.5)
ALP SERPL-CCNC: 64 U/L (ref 35–104)
ALT SERPL-CCNC: 27 U/L (ref 10–35)
ANION GAP SERPL CALCULATED.3IONS-SCNC: 9 MMOL/L (ref 9–16)
AST SERPL-CCNC: 33 U/L (ref 10–35)
BACTERIA URNS QL MICRO: NORMAL
BASOPHILS # BLD: 0.04 K/UL (ref 0–0.2)
BASOPHILS NFR BLD: 1 % (ref 0–2)
BILIRUB SERPL-MCNC: 0.6 MG/DL (ref 0–1.2)
BILIRUB UR QL STRIP: NEGATIVE
BUN SERPL-MCNC: 10 MG/DL (ref 8–23)
C3 SERPL-MCNC: 151 MG/DL (ref 90–180)
C4 SERPL-MCNC: 49 MG/DL (ref 10–40)
CA-I BLD-SCNC: 1.26 MMOL/L (ref 1.13–1.33)
CALCIUM SERPL-MCNC: 10.1 MG/DL (ref 8.6–10.4)
CASTS #/AREA URNS LPF: NORMAL /LPF (ref 0–8)
CHLORIDE SERPL-SCNC: 107 MMOL/L (ref 98–107)
CLARITY UR: CLEAR
CO2 SERPL-SCNC: 27 MMOL/L (ref 20–31)
COLOR UR: YELLOW
CREAT SERPL-MCNC: 1.2 MG/DL (ref 0.6–0.9)
CREAT UR-MCNC: 138 MG/DL (ref 28–217)
EOSINOPHIL # BLD: 0.11 K/UL (ref 0–0.44)
EOSINOPHILS RELATIVE PERCENT: 2 % (ref 1–4)
EPI CELLS #/AREA URNS HPF: NORMAL /HPF (ref 0–5)
ERYTHROCYTE [DISTWIDTH] IN BLOOD BY AUTOMATED COUNT: 13.5 % (ref 11.8–14.4)
FREE KAPPA/LAMBDA RATIO: 1.81 (ref 0.22–1.74)
GFR, ESTIMATED: 49 ML/MIN/1.73M2
GLUCOSE SERPL-MCNC: 91 MG/DL (ref 74–99)
GLUCOSE UR STRIP-MCNC: NEGATIVE MG/DL
HAV IGM SERPL QL IA: NONREACTIVE
HBV CORE IGM SERPL QL IA: NONREACTIVE
HBV SURFACE AG SERPL QL IA: NONREACTIVE
HCT VFR BLD AUTO: 41.8 % (ref 36.3–47.1)
HCV AB SERPL QL IA: NONREACTIVE
HGB BLD-MCNC: 13.2 G/DL (ref 11.9–15.1)
HGB UR QL STRIP.AUTO: NEGATIVE
IMM GRANULOCYTES # BLD AUTO: <0.03 K/UL (ref 0–0.3)
IMM GRANULOCYTES NFR BLD: 0 %
KAPPA LC FREE SER-MCNC: 27.3 MG/L
KETONES UR STRIP-MCNC: NEGATIVE MG/DL
LAMBDA LC FREE SERPL-MCNC: 15.1 MG/L (ref 4.2–27.7)
LEUKOCYTE ESTERASE UR QL STRIP: ABNORMAL
LYMPHOCYTES NFR BLD: 3.17 K/UL (ref 1.1–3.7)
LYMPHOCYTES RELATIVE PERCENT: 52 % (ref 24–43)
MCH RBC QN AUTO: 27.1 PG (ref 25.2–33.5)
MCHC RBC AUTO-ENTMCNC: 31.6 G/DL (ref 28.4–34.8)
MCV RBC AUTO: 85.8 FL (ref 82.6–102.9)
MONOCYTES NFR BLD: 0.26 K/UL (ref 0.1–1.2)
MONOCYTES NFR BLD: 4 % (ref 3–12)
NEUTROPHILS NFR BLD: 41 % (ref 36–65)
NEUTS SEG NFR BLD: 2.46 K/UL (ref 1.5–8.1)
NITRITE UR QL STRIP: NEGATIVE
NRBC BLD-RTO: 0 PER 100 WBC
PH UR STRIP: 6.5 [PH] (ref 5–8)
PHOSPHATE SERPL-MCNC: 3.4 MG/DL (ref 2.5–4.5)
PLATELET # BLD AUTO: 273 K/UL (ref 138–453)
PMV BLD AUTO: 10.4 FL (ref 8.1–13.5)
POTASSIUM SERPL-SCNC: 4.7 MMOL/L (ref 3.7–5.3)
PROT SERPL-MCNC: 6.9 G/DL (ref 6.6–8.7)
PROT UR STRIP-MCNC: NEGATIVE MG/DL
PTH-INTACT SERPL-MCNC: 76 PG/ML (ref 17.9–58.6)
RBC # BLD AUTO: 4.87 M/UL (ref 3.95–5.11)
RBC #/AREA URNS HPF: NORMAL /HPF (ref 0–4)
SODIUM SERPL-SCNC: 143 MMOL/L (ref 136–145)
SODIUM UR-SCNC: 107 MMOL/L
SP GR UR STRIP: 1.02 (ref 1–1.03)
TOTAL PROTEIN, URINE: 8 MG/DL
UROBILINOGEN UR STRIP-ACNC: NORMAL EU/DL (ref 0–1)
WBC #/AREA URNS HPF: NORMAL /HPF (ref 0–5)
WBC OTHER # BLD: 6.1 K/UL (ref 3.5–11.3)

## 2025-08-22 PROCEDURE — 76770 US EXAM ABDO BACK WALL COMP: CPT

## 2025-08-22 PROCEDURE — 84300 ASSAY OF URINE SODIUM: CPT

## 2025-08-22 PROCEDURE — 82306 VITAMIN D 25 HYDROXY: CPT

## 2025-08-22 PROCEDURE — 81001 URINALYSIS AUTO W/SCOPE: CPT

## 2025-08-22 PROCEDURE — 84100 ASSAY OF PHOSPHORUS: CPT

## 2025-08-22 PROCEDURE — 86225 DNA ANTIBODY NATIVE: CPT

## 2025-08-22 PROCEDURE — 86160 COMPLEMENT ANTIGEN: CPT

## 2025-08-22 PROCEDURE — 84156 ASSAY OF PROTEIN URINE: CPT

## 2025-08-22 PROCEDURE — 36415 COLL VENOUS BLD VENIPUNCTURE: CPT

## 2025-08-22 PROCEDURE — 82570 ASSAY OF URINE CREATININE: CPT

## 2025-08-22 PROCEDURE — 82330 ASSAY OF CALCIUM: CPT

## 2025-08-22 PROCEDURE — 86334 IMMUNOFIX E-PHORESIS SERUM: CPT

## 2025-08-22 PROCEDURE — 85025 COMPLETE CBC W/AUTO DIFF WBC: CPT

## 2025-08-22 PROCEDURE — 80074 ACUTE HEPATITIS PANEL: CPT

## 2025-08-22 PROCEDURE — 83970 ASSAY OF PARATHORMONE: CPT

## 2025-08-22 PROCEDURE — 83521 IG LIGHT CHAINS FREE EACH: CPT

## 2025-08-22 PROCEDURE — 80053 COMPREHEN METABOLIC PANEL: CPT

## 2025-08-22 PROCEDURE — 86038 ANTINUCLEAR ANTIBODIES: CPT

## 2025-08-25 LAB
ANA SER QL IA: NEGATIVE
DSDNA IGG SER QL IA: 0.8 IU/ML
ITYP INTERPRETATION: NORMAL
NUCLEAR IGG SER IA-RTO: <0.1 U/ML
PATH REV: NORMAL

## (undated) DEVICE — GOWN,POLY REINFORCED,LG: Brand: MEDLINE

## (undated) DEVICE — CANNULA NSL AD TBNG L7FT PVC STR NONFLARED PRNG O2 DEL W STD

## (undated) DEVICE — DEFENDO AIR WATER SUCTION AND BIOPSY VALVE KIT FOR  OLYMPUS: Brand: DEFENDO AIR/WATER/SUCTION AND BIOPSY VALVE

## (undated) DEVICE — Z DUPLICATE USE 2527422 TUBING O2 STD 7 FT EXTN NO CRUSH VISUAL CNTRST LF

## (undated) DEVICE — JELLY,LUBE,STERILE,FLIP TOP,TUBE,2-OZ: Brand: MEDLINE

## (undated) DEVICE — Z DISCONTINUED USE 2424143 ADAPTER O2 SWVL CHRISTMAS TREE GRN

## (undated) DEVICE — GLOVE ORANGE PI 7   MSG9070

## (undated) DEVICE — Z INACTIVE USE 2525529 CONNECTOR TBNG FOR O2